# Patient Record
Sex: MALE | Race: WHITE | NOT HISPANIC OR LATINO | Employment: OTHER | URBAN - NONMETROPOLITAN AREA
[De-identification: names, ages, dates, MRNs, and addresses within clinical notes are randomized per-mention and may not be internally consistent; named-entity substitution may affect disease eponyms.]

---

## 2017-01-25 ENCOUNTER — OFFICE VISIT (OUTPATIENT)
Dept: CARDIOLOGY | Facility: CLINIC | Age: 67
End: 2017-01-25

## 2017-01-25 VITALS
DIASTOLIC BLOOD PRESSURE: 92 MMHG | BODY MASS INDEX: 36.94 KG/M2 | SYSTOLIC BLOOD PRESSURE: 178 MMHG | HEIGHT: 70 IN | WEIGHT: 258 LBS | HEART RATE: 62 BPM

## 2017-01-25 DIAGNOSIS — E78.5 HYPERLIPIDEMIA LDL GOAL <70: ICD-10-CM

## 2017-01-25 DIAGNOSIS — I25.10 CORONARY ARTERY DISEASE INVOLVING NATIVE CORONARY ARTERY OF NATIVE HEART WITHOUT ANGINA PECTORIS: Primary | ICD-10-CM

## 2017-01-25 DIAGNOSIS — I10 ESSENTIAL HYPERTENSION: ICD-10-CM

## 2017-01-25 PROCEDURE — 99213 OFFICE O/P EST LOW 20 MIN: CPT | Performed by: NURSE PRACTITIONER

## 2017-01-25 RX ORDER — LOSARTAN POTASSIUM 25 MG/1
25 TABLET ORAL DAILY
Qty: 90 TABLET | Refills: 3 | Status: SHIPPED | OUTPATIENT
Start: 2017-01-25 | End: 2017-04-19 | Stop reason: SINTOL

## 2017-01-25 RX ORDER — MULTIVIT WITH MINERALS/LUTEIN
1 TABLET ORAL DAILY
COMMUNITY

## 2017-01-25 RX ORDER — OMEPRAZOLE 40 MG/1
40 CAPSULE, DELAYED RELEASE ORAL DAILY
COMMUNITY
End: 2020-11-06 | Stop reason: SDUPTHER

## 2017-01-25 RX ORDER — CHLORTHALIDONE 25 MG/1
25 TABLET ORAL DAILY
Qty: 90 TABLET | Refills: 3 | Status: SHIPPED | OUTPATIENT
Start: 2017-01-25 | End: 2017-07-07 | Stop reason: SDUPTHER

## 2017-01-25 RX ORDER — CHLORTHALIDONE 25 MG/1
25 TABLET ORAL DAILY
COMMUNITY
End: 2017-01-25 | Stop reason: SDUPTHER

## 2017-01-25 NOTE — MR AVS SNAPSHOT
Ed Geller   1/25/2017 10:30 AM   Office Visit    Dept Phone:  [phone]   Encounter #:  [id number]    Provider:  Carlos Richter MD   Department:  [facility] CARDIOLOGY                Your Full Care Plan              Today's Medication Changes          These changes are accurate as of: 1/25/17 10:39 AM.  If you have any questions, ask your nurse or doctor.               New Medication(s)Ordered:     losartan 25 MG tablet   Commonly known as:  COZAAR   Take 1 tablet by mouth Daily.   Started by:  Carlos Richter MD            Where to Get Your Medications      These medications were sent to [address] - [address] - [phone]  - [phone]   [address], [place] [address]     Phone:  [phone]     losartan 25 MG tablet                  Your Updated Medication List          This list is accurate as of: 1/25/17 10:39 AM.  Always use your most recent med list.                aspirin 81 MG tablet       CENTRUM SILVER tablet       chlorthalidone 25 MG tablet   Commonly known as:  HYGROTON       GLUCOSAMINE-CHONDROITIN PO       losartan 25 MG tablet   Commonly known as:  COZAAR   Take 1 tablet by mouth Daily.       metoprolol tartrate 25 MG tablet   Commonly known as:  LOPRESSOR       omeprazole 40 MG capsule   Commonly known as:  priLOSEC               We Performed the Following     Lipid Panel       You Were Diagnosed With        Codes Comments    Coronary artery disease involving native coronary artery of native heart without angina pectoris    -  Primary ICD-10-CM: I25.10  ICD-9-CM: 414.01     Essential hypertension     ICD-10-CM: I10  ICD-9-CM: 401.9     Hyperlipidemia LDL goal <70     ICD-10-CM: E78.5  ICD-9-CM: 272.4       Instructions     None    Patient Instructions History      Upcoming Appointments     Visit Type Date Time Department    FOLLOW UP 1/25/2017 10:30 AM JUAN M RICHTER    FOLLOW  "UP 2017 10:15 AM JUAN M RICHTER      [employer] Signup     [facility] [employer] allows you to send messages to your doctor, view your test results, renew your prescriptions, schedule appointments, and more. To sign up, go to [employer] and click on the Sign Up Now link in the New User? box. Enter your [employer] Activation Code exactly as it appears below along with the last four digits of your Social Security Number and your Date of Birth () to complete the sign-up process. If you do not sign up before the expiration date, you must request a new code.    B[employer] Activation Code: [email or web address]  Expires: 2017 10:38 AM    If you have questions, you can email [email or web address] or call [phone] to talk to our B[employer] staff. Remember, B[employer] is NOT to be used for urgent needs. For medical emergencies, dial 911.               Other Info from Your Visit           Your Appointments     2017 10:15 AM EDT   Follow Up with Carlos Richter MD   [facility] [place] [facility] (--)    [address]  [facility] [address] [phone]           Arrive 15 minutes prior to appointment.              Allergies     Ciprofloxacin      Atorvastatin  Rash      Reason for Visit     Follow-up     Coronary Artery Disease     Hypertension     Hyperlipidemia           Vital Signs     Blood Pressure Pulse Height Weight Body Mass Index Smoking Status    178/92 (BP Location: Right arm, Patient Position: Sitting) 62 70\" (177.8 cm) 258 lb (117 kg) 37.02 kg/m2 Never Smoker      Problems and Diagnoses Noted     Coronary artery disease involving native coronary artery of native heart without angina pectoris    High blood pressure    Hyperlipidemia LDL goal <70        "

## 2017-01-25 NOTE — LETTER
January 25, 2017     Phoebe Verma, APRN  [address]  [address]  [facility] [address]    Patient: Ed Geller   YOB: 1950   Date of Visit: 1/25/2017       Dear Dr. Verma, ELY:    Thank you for referring Ed Geller to me for evaluation. Below are the relevant portions of my assessment and plan of care.    If you have questions, please do not hesitate to call me. I look forward to following Ed along with you.         Sincerely,        Carlos Richter MD        CC: No Recipients  ELY Delacruz  1/25/2017 10:44 AM  Signed  Encounter Date:01/25/2017    Patient ID: Ed Geller is a 66 y.o. male who is  and resides in [place], [place].    CC/Reason for visit:  Follow-up; Coronary Artery Disease; Hypertension; and Hyperlipidemia          Ed Geller returns today for follow-up of his coronary artery disease, hypertension and hyperlipidemia.  Since his last visit with us he has done well.  He denies any signs or symptoms to suggest angina pectoris.  He specifically denies chest pain/heaviness/pressure, dyspnea, orthopnea, palpitations or syncope.  At his last visit he was advised to start Crestor given his known history of coronary artery disease however he did not do this and is very hesitant about starting any statin therapy since the results of his lipid profile indicated a low cholesterol and LDL level..  His blood pressure was elevated at his last visit and his chlorthalidone was increased to 25 mg daily.  His blood pressure remains elevated at today's visit with a systolic in the 170s.  The patient does report he has started an exercise program and is trying to lose weight.  He has started a low carb diet and is walking on his treadmill for 30 minutes most days of the week.  He is able to do these activities without any difficulties.  He is hoping that his lifestyle modifications will assist him and control of his blood pressure.    Review of Systems   All other systems  "reviewed and are negative.      The patient's past medical, social, and family history reviewed in the patient's electronic medical record.    Allergies  Ciprofloxacin and Atorvastatin    Outpatient Prescriptions Marked as Taking for the 1/25/17 encounter (Office Visit) with Carlos Richter MD   Medication Sig Dispense Refill   • aspirin 81 MG tablet Take 81 mg by mouth Daily.     • chlorthalidone (HYGROTON) 25 MG tablet Take 1 tablet by mouth Daily. 90 tablet 3   • GLUCOSAMINE-CHONDROITIN PO Take 1 tablet by mouth Daily.     • metoprolol tartrate (LOPRESSOR) 25 MG tablet Take 1 tablet by mouth 2 (Two) Times a Day. 180 tablet 3   • Multiple Vitamins-Minerals (CENTRUM SILVER) tablet Take 1 tablet by mouth Daily.     • omeprazole (priLOSEC) 40 MG capsule Take 40 mg by mouth Daily.     • [DISCONTINUED] chlorthalidone (HYGROTON) 25 MG tablet Take 25 mg by mouth Daily.     • [DISCONTINUED] metoprolol tartrate (LOPRESSOR) 25 MG tablet Take 25 mg by mouth 2 (Two) Times a Day.           Blood pressure 178/92, pulse 62, height 70\" (177.8 cm), weight 258 lb (117 kg).  Body mass index is 37.02 kg/(m^2).    Physical Exam   Constitutional: He is oriented to person, place, and time. He appears well-developed and well-nourished.   HENT:   Head: Normocephalic and atraumatic.   Eyes: Pupils are equal, round, and reactive to light. No scleral icterus.   Neck: No JVD present. Carotid bruit is not present. No thyromegaly present.   Cardiovascular: Normal rate, regular rhythm, S1 normal and S2 normal.  Exam reveals no gallop.    No murmur heard.  Pulmonary/Chest: Effort normal and breath sounds normal.   Abdominal: Soft. There is no tenderness.   Neurological: He is alert and oriented to person, place, and time.   Skin: Skin is warm and dry. No cyanosis. Nails show no clubbing.   Psychiatric: He has a normal mood and affect. His behavior is normal.       Data Review:   Procedures         Problem List Items Addressed This Visit "        Cardiovascular and Mediastinum    Coronary artery disease involving native coronary artery of native heart without angina pectoris - Primary    Overview     · Cardiac catheterization (2010) Dr. Whitehead:  60% mid-LAD stenosis, normal LVEF.  · Pharmacologic MPS (10/23/2015):  Mild apical ischemia, normal LVEF.  Low risk study.   · CCS class II anginal symptoms.           Current Assessment & Plan     · Continue aspirin 81 mg daily  · Continue metoprolol tartrate 25 mg twice a day         Relevant Medications    metoprolol tartrate (LOPRESSOR) 25 MG tablet    Essential hypertension    Current Assessment & Plan     · Start losartan 25 mg daily   · CMP in 2 weeks  · Monitor blood pressure and contact us if SBP remains >140 after 3 weeks  · Continue chlorthalidone 25 mg daily           Relevant Medications    losartan (COZAAR) 25 MG tablet    chlorthalidone (HYGROTON) 25 MG tablet    metoprolol tartrate (LOPRESSOR) 25 MG tablet    Hyperlipidemia LDL goal <70    Overview     · High intensity statin therapy indicated.         Current Assessment & Plan     · Obtain lipid panel  · Patient refuses statin therapy at this time we'll readdress at next visit         Relevant Orders    Comprehensive Metabolic Panel    Lipid Panel        Mr. Segal blood pressure remains uncontrolled at this time.  We will start losartan 25 mg daily and check his renal function in 2 weeks.  I have requested he monitor his blood pressure over the next 3-4 weeks and to contact us if his systolic blood pressure is consistently above 140 as we will want to increase his dose of losartan to 50 mg daily.  He does not wish to start statin therapy at this time and given the fact his cholesterol numbers are low this is appropriate.  I we will recheck his lipid panel and will address statin therapy at his follow-up visit.       · Follow-up 6 months or sooner if needed.    Kat GRAVES evaluated and treated patient independently    Cherelle  Anuel, ELY  1/25/2017

## 2017-01-25 NOTE — PROGRESS NOTES
Encounter Date:01/25/2017    Patient ID: Ed Geller is a 66 y.o. male who is  and resides in [place], [place].    CC/Reason for visit:  Follow-up; Coronary Artery Disease; Hypertension; and Hyperlipidemia          Ed Geller returns today for follow-up of his coronary artery disease, hypertension and hyperlipidemia.  Since his last visit with us he has done well.  He denies any signs or symptoms to suggest angina pectoris.  He specifically denies chest pain/heaviness/pressure, dyspnea, orthopnea, palpitations or syncope.  At his last visit he was advised to start Crestor given his known history of coronary artery disease however he did not do this and is very hesitant about starting any statin therapy since the results of his lipid profile indicated a low cholesterol and LDL level..  His blood pressure was elevated at his last visit and his chlorthalidone was increased to 25 mg daily.  His blood pressure remains elevated at today's visit with a systolic in the 170s.  The patient does report he has started an exercise program and is trying to lose weight.  He has started a low carb diet and is walking on his treadmill for 30 minutes most days of the week.  He is able to do these activities without any difficulties.  He is hoping that his lifestyle modifications will assist him and control of his blood pressure.    Review of Systems   All other systems reviewed and are negative.      The patient's past medical, social, and family history reviewed in the patient's electronic medical record.    Allergies  Ciprofloxacin and Atorvastatin    Outpatient Prescriptions Marked as Taking for the 1/25/17 encounter (Office Visit) with Carlos Richter MD   Medication Sig Dispense Refill   • aspirin 81 MG tablet Take 81 mg by mouth Daily.     • chlorthalidone (HYGROTON) 25 MG tablet Take 1 tablet by mouth Daily. 90 tablet 3   • GLUCOSAMINE-CHONDROITIN PO Take 1 tablet by mouth Daily.     • metoprolol tartrate  "(LOPRESSOR) 25 MG tablet Take 1 tablet by mouth 2 (Two) Times a Day. 180 tablet 3   • Multiple Vitamins-Minerals (CENTRUM SILVER) tablet Take 1 tablet by mouth Daily.     • omeprazole (priLOSEC) 40 MG capsule Take 40 mg by mouth Daily.     • [DISCONTINUED] chlorthalidone (HYGROTON) 25 MG tablet Take 25 mg by mouth Daily.     • [DISCONTINUED] metoprolol tartrate (LOPRESSOR) 25 MG tablet Take 25 mg by mouth 2 (Two) Times a Day.           Blood pressure 178/92, pulse 62, height 70\" (177.8 cm), weight 258 lb (117 kg).  Body mass index is 37.02 kg/(m^2).    Physical Exam   Constitutional: He is oriented to person, place, and time. He appears well-developed and well-nourished.   HENT:   Head: Normocephalic and atraumatic.   Eyes: Pupils are equal, round, and reactive to light. No scleral icterus.   Neck: No JVD present. Carotid bruit is not present. No thyromegaly present.   Cardiovascular: Normal rate, regular rhythm, S1 normal and S2 normal.  Exam reveals no gallop.    No murmur heard.  Pulmonary/Chest: Effort normal and breath sounds normal.   Abdominal: Soft. There is no tenderness.   Neurological: He is alert and oriented to person, place, and time.   Skin: Skin is warm and dry. No cyanosis. Nails show no clubbing.   Psychiatric: He has a normal mood and affect. His behavior is normal.       Data Review:   Procedures         Problem List Items Addressed This Visit        Cardiovascular and Mediastinum    Coronary artery disease involving native coronary artery of native heart without angina pectoris - Primary    Overview     · Cardiac catheterization (2010) Dr. Whitehead:  60% mid-LAD stenosis, normal LVEF.  · Pharmacologic MPS (10/23/2015):  Mild apical ischemia, normal LVEF.  Low risk study.   · CCS class II anginal symptoms.           Current Assessment & Plan     · Continue aspirin 81 mg daily  · Continue metoprolol tartrate 25 mg twice a day         Relevant Medications    metoprolol tartrate (LOPRESSOR) 25 MG " tablet    Essential hypertension    Current Assessment & Plan     · Start losartan 25 mg daily   · CMP in 2 weeks  · Monitor blood pressure and contact us if SBP remains >140 after 3 weeks  · Continue chlorthalidone 25 mg daily           Relevant Medications    losartan (COZAAR) 25 MG tablet    chlorthalidone (HYGROTON) 25 MG tablet    metoprolol tartrate (LOPRESSOR) 25 MG tablet    Hyperlipidemia LDL goal <70    Overview     · High intensity statin therapy indicated.         Current Assessment & Plan     · Obtain lipid panel  · Patient refuses statin therapy at this time we'll readdress at next visit         Relevant Orders    Comprehensive Metabolic Panel    Lipid Panel        Mr. Segal blood pressure remains uncontrolled at this time.  We will start losartan 25 mg daily and check his renal function in 2 weeks.  I have requested he monitor his blood pressure over the next 3-4 weeks and to contact us if his systolic blood pressure is consistently above 140 as we will want to increase his dose of losartan to 50 mg daily.  He does not wish to start statin therapy at this time and given the fact his cholesterol numbers are low this is appropriate.  I we will recheck his lipid panel and will address statin therapy at his follow-up visit.       · Follow-up 6 months or sooner if needed.    Kat GRAVES evaluated and treated patient independently    ELY Delacruz  1/25/2017

## 2017-01-25 NOTE — ASSESSMENT & PLAN NOTE
· Start losartan 25 mg daily   · CMP in 2 weeks  · Monitor blood pressure and contact us if SBP remains >140 after 3 weeks  · Continue chlorthalidone 25 mg daily

## 2017-04-19 ENCOUNTER — TELEPHONE (OUTPATIENT)
Dept: CARDIOLOGY | Facility: CLINIC | Age: 67
End: 2017-04-19

## 2017-04-19 RX ORDER — AMLODIPINE BESYLATE 5 MG/1
5 TABLET ORAL DAILY
Qty: 90 TABLET | Refills: 0 | Status: SHIPPED | OUTPATIENT
Start: 2017-04-19 | End: 2017-07-21 | Stop reason: SDUPTHER

## 2017-04-19 NOTE — TELEPHONE ENCOUNTER
Patient called reporting since starting the losartan, he has felt lightheaded and fatigued. His BP has been ranging 128-140/50-60's, HR 58. He is on losartan 25 mg, metoprolol 25 mg BID, chlorthalidone 25 mg daily. He has tried lisinopril in the past but could not tolerate d/t rash. Is there something else he could try besides the losartan?

## 2017-07-07 RX ORDER — CHLORTHALIDONE 25 MG/1
25 TABLET ORAL DAILY
Qty: 90 TABLET | Refills: 0 | Status: SHIPPED | OUTPATIENT
Start: 2017-07-07 | End: 2017-07-26 | Stop reason: SDUPTHER

## 2017-07-07 NOTE — TELEPHONE ENCOUNTER
I spoke with the patient and he verbalized understanding of obtaining lab work. Lab orders mailed.

## 2017-07-21 RX ORDER — AMLODIPINE BESYLATE 5 MG/1
5 TABLET ORAL DAILY
Qty: 90 TABLET | Refills: 1 | Status: SHIPPED | OUTPATIENT
Start: 2017-07-21 | End: 2017-07-26 | Stop reason: SDUPTHER

## 2017-07-26 ENCOUNTER — OFFICE VISIT (OUTPATIENT)
Dept: CARDIOLOGY | Facility: CLINIC | Age: 67
End: 2017-07-26

## 2017-07-26 VITALS
SYSTOLIC BLOOD PRESSURE: 144 MMHG | BODY MASS INDEX: 38.11 KG/M2 | HEIGHT: 70 IN | DIASTOLIC BLOOD PRESSURE: 80 MMHG | WEIGHT: 266.2 LBS | HEART RATE: 66 BPM

## 2017-07-26 DIAGNOSIS — E78.5 HYPERLIPIDEMIA LDL GOAL <70: ICD-10-CM

## 2017-07-26 DIAGNOSIS — I25.10 CORONARY ARTERY DISEASE INVOLVING NATIVE CORONARY ARTERY OF NATIVE HEART WITHOUT ANGINA PECTORIS: Primary | ICD-10-CM

## 2017-07-26 DIAGNOSIS — I10 ESSENTIAL HYPERTENSION: ICD-10-CM

## 2017-07-26 PROCEDURE — 99214 OFFICE O/P EST MOD 30 MIN: CPT | Performed by: INTERNAL MEDICINE

## 2017-07-26 RX ORDER — PSEUDOEPHEDRINE HCL 30 MG
30 TABLET ORAL DAILY
COMMUNITY
End: 2018-09-26

## 2017-07-26 RX ORDER — CHLORTHALIDONE 25 MG/1
25 TABLET ORAL DAILY
Qty: 90 TABLET | Refills: 3 | Status: SHIPPED | OUTPATIENT
Start: 2017-07-26 | End: 2018-02-14 | Stop reason: SDUPTHER

## 2017-07-26 RX ORDER — AMLODIPINE BESYLATE 5 MG/1
5 TABLET ORAL DAILY
Qty: 90 TABLET | Refills: 3 | Status: SHIPPED | OUTPATIENT
Start: 2017-07-26 | End: 2018-02-14 | Stop reason: SDUPTHER

## 2017-07-26 RX ORDER — CETIRIZINE HYDROCHLORIDE 10 MG/1
10 TABLET ORAL AS NEEDED
Status: ON HOLD | COMMUNITY
End: 2022-11-02

## 2017-07-26 NOTE — ASSESSMENT & PLAN NOTE
· The patient continues to have no anginal symptoms  · Continue aspirin, beta blocker, calcium channel blocker therapy.  · Patient refuses statin therapy

## 2017-07-26 NOTE — PROGRESS NOTES
Encounter Date:07/26/2017    Patient ID: Ed Geller is a  67 y.o. male who resides in [place], [place].    CC/Reason for visit:  Coronary Artery Disease; Hypertension; and Hyperlipidemia          Ed Geller returns to my office today as a follow up for coronary artery disease, hypertension, and hyperlipidemia. Since last visit, patient has been feeling well overall from a cardiovascular standpoint. He denies chest pain. He states his blood pressure has been well controlled since his medication was adjusted.     Review of Systems   Constitution: Negative for weakness and malaise/fatigue.   Eyes: Negative for vision loss in left eye and vision loss in right eye.   Cardiovascular: Negative for chest pain, dyspnea on exertion, near-syncope, orthopnea, palpitations, paroxysmal nocturnal dyspnea and syncope.   Musculoskeletal: Negative for myalgias.   Neurological: Negative for brief paralysis, excessive daytime sleepiness, focal weakness, numbness and paresthesias.   All other systems reviewed and are negative.      The patient's past medical, social, and family history reviewed in the patient's electronic medical record.    Allergies  Ciprofloxacin; Losartan; Sulfur; Atorvastatin; and Lisinopril    Outpatient Prescriptions Marked as Taking for the 7/26/17 encounter (Office Visit) with Carlos Richter MD   Medication Sig Dispense Refill   • amLODIPine (NORVASC) 5 MG tablet Take 1 tablet by mouth Daily for 360 days. 90 tablet 3   • aspirin 81 MG tablet Take 1 tablet by mouth Daily for 360 days. 90 tablet 3   • cetirizine (zyrTEC) 10 MG tablet Take 10 mg by mouth Daily.     • chlorthalidone (HYGROTON) 25 MG tablet Take 1 tablet by mouth Daily for 360 days. 90 tablet 3   • Cyanocobalamin (VITAMIN B12 PO) Take 5,000 mcg by mouth Daily.     • GLUCOSAMINE-CHONDROITIN PO Take 1 tablet by mouth Daily.     • Multiple Vitamins-Minerals (CENTRUM SILVER) tablet Take 1 tablet by mouth Daily.     • omeprazole (priLOSEC)  "40 MG capsule Take 40 mg by mouth Daily.     • pseudoephedrine (SUDAFED) 30 MG tablet Take 30 mg by mouth Daily.     • [DISCONTINUED] amLODIPine (NORVASC) 5 MG tablet Take 1 tablet by mouth Daily. 90 tablet 1   • [DISCONTINUED] aspirin 81 MG tablet Take 81 mg by mouth Daily.     • [DISCONTINUED] chlorthalidone (HYGROTON) 25 MG tablet Take 1 tablet by mouth Daily. 90 tablet 0         Blood pressure 144/80, pulse 66, height 70\" (177.8 cm), weight 266 lb 3.2 oz (121 kg).  Body mass index is 38.2 kg/(m^2).    Physical Exam   Constitutional: He is oriented to person, place, and time. He appears well-developed and well-nourished.   HENT:   Head: Normocephalic and atraumatic.   Eyes: Pupils are equal, round, and reactive to light. No scleral icterus.   Neck: No JVD present. Carotid bruit is not present. No thyromegaly present.   Cardiovascular: Normal rate, regular rhythm, S1 normal and S2 normal.  Exam reveals no gallop.    No murmur heard.  Pulmonary/Chest: Effort normal and breath sounds normal.   Abdominal: Soft. There is no tenderness.   Neurological: He is alert and oriented to person, place, and time.   Skin: Skin is warm and dry. No cyanosis. Nails show no clubbing.   Psychiatric: He has a normal mood and affect. His behavior is normal.       Data Review:   Procedures         Problem List Items Addressed This Visit        Cardiovascular and Mediastinum    Coronary artery disease involving native coronary artery of native heart without angina pectoris - Primary    Overview     · Cardiac catheterization at [facility] (2010):  60% mid-LAD stenosis, normal LVEF.  · Pharmacologic MPS (10/23/2015):  Mild apical ischemia, normal LVEF.  Low risk study.          Current Assessment & Plan     · The patient continues to have no anginal symptoms  · Continue aspirin, beta blocker, calcium channel blocker therapy.  · Patient refuses statin therapy         Relevant Medications    amLODIPine (NORVASC) 5 MG tablet    aspirin 81 MG tablet "    metoprolol tartrate (LOPRESSOR) 25 MG tablet    Essential hypertension    Current Assessment & Plan     · Blood pressure mildly elevated today's visit.  · Patient did not tolerate losartan but is doing well with amlodipine  · Do not recommend any changes at present.         Relevant Medications    amLODIPine (NORVASC) 5 MG tablet    metoprolol tartrate (LOPRESSOR) 25 MG tablet    chlorthalidone (HYGROTON) 25 MG tablet    Hyperlipidemia LDL goal <70    Overview     · High intensity statin therapy indicated.         Current Assessment & Plan     · Patient refuses statin therapy                    · Continue present medical therapy  · If blood pressure elevated at future office visits, increase amlodipine to 10 mg daily    Carlos Richter MD  7/26/2017     Scribed for Carlos Richter MD by Ernesto Wadsworth. 7/26/2017  10:08 AM    I, Carlos Richter MD, personally performed the services described in this documentation as scribed by the above named individual in my presence, and it is both accurate and complete.  7/26/2017  10:08 AM

## 2017-07-26 NOTE — ASSESSMENT & PLAN NOTE
· Blood pressure mildly elevated today's visit.  · Patient did not tolerate losartan but is doing well with amlodipine  · Do not recommend any changes at present.

## 2017-07-28 DIAGNOSIS — E78.5 HYPERLIPIDEMIA: ICD-10-CM

## 2017-07-28 DIAGNOSIS — E78.5 HYPERLIPIDEMIA LDL GOAL <70: ICD-10-CM

## 2018-02-14 ENCOUNTER — OFFICE VISIT (OUTPATIENT)
Dept: CARDIOLOGY | Facility: CLINIC | Age: 68
End: 2018-02-14

## 2018-02-14 VITALS
WEIGHT: 260 LBS | BODY MASS INDEX: 38.51 KG/M2 | HEART RATE: 71 BPM | HEIGHT: 69 IN | SYSTOLIC BLOOD PRESSURE: 130 MMHG | DIASTOLIC BLOOD PRESSURE: 82 MMHG

## 2018-02-14 DIAGNOSIS — I10 ESSENTIAL HYPERTENSION: ICD-10-CM

## 2018-02-14 DIAGNOSIS — I25.119 CORONARY ARTERY DISEASE INVOLVING NATIVE CORONARY ARTERY OF NATIVE HEART WITH ANGINA PECTORIS (HCC): Primary | ICD-10-CM

## 2018-02-14 DIAGNOSIS — E78.5 HYPERLIPIDEMIA LDL GOAL <70: ICD-10-CM

## 2018-02-14 PROCEDURE — 99214 OFFICE O/P EST MOD 30 MIN: CPT | Performed by: INTERNAL MEDICINE

## 2018-02-14 RX ORDER — ROSUVASTATIN CALCIUM 10 MG/1
10 TABLET, COATED ORAL DAILY
Qty: 90 TABLET | Refills: 3 | Status: SHIPPED | OUTPATIENT
Start: 2018-02-14 | End: 2018-09-26 | Stop reason: SDUPTHER

## 2018-02-14 RX ORDER — CHLORTHALIDONE 25 MG/1
25 TABLET ORAL DAILY
Qty: 90 TABLET | Refills: 3 | Status: SHIPPED | OUTPATIENT
Start: 2018-02-14 | End: 2018-09-26 | Stop reason: SDUPTHER

## 2018-02-14 RX ORDER — NITROGLYCERIN 0.4 MG/1
TABLET SUBLINGUAL
Qty: 25 TABLET | Refills: 5 | Status: SHIPPED | OUTPATIENT
Start: 2018-02-14 | End: 2018-09-26 | Stop reason: SDUPTHER

## 2018-02-14 RX ORDER — AMLODIPINE BESYLATE 5 MG/1
5 TABLET ORAL DAILY
Qty: 90 TABLET | Refills: 3 | Status: SHIPPED | OUTPATIENT
Start: 2018-02-14 | End: 2018-09-26 | Stop reason: SDUPTHER

## 2018-02-14 NOTE — PROGRESS NOTES
Encounter Date:02/14/2018    Patient ID: Ed Geller is a  67 y.o. male who resides in [place], [place].    CC/Reason for visit:  Coronary Artery Disease            Ed Geller returns to my office today in follow up of his coronary artery disease, hypertension, and hyperlipidemia. Since last visit, patient has been feeling well overall from a cardiovascular standpoint. He states that he has been feeling the best he has in 25 years from a cardiac standpoint. He reports that he missed his last two appointments due to lack of cell phone service. He denies chest pain or discomfort. He monitors his blood pressure at home and notes it typically runs within normal limits. Patient states that he is not as physically active as he once was, but attributes it to lower extremity pain from bilateral achilles tendon pain.     Review of Systems   Constitution: Negative for weakness and malaise/fatigue.   Eyes: Negative for vision loss in left eye and vision loss in right eye.   Cardiovascular: Negative for chest pain, dyspnea on exertion, near-syncope, orthopnea, palpitations, paroxysmal nocturnal dyspnea and syncope.   Musculoskeletal: Negative for myalgias.   Neurological: Negative for brief paralysis, excessive daytime sleepiness, focal weakness, numbness and paresthesias.   All other systems reviewed and are negative.      The patient's past medical, social, family history and ROS reviewed in the patient's electronic medical record.    Allergies  Ciprofloxacin; Losartan; Sulfur; Atorvastatin; and Lisinopril    Outpatient Prescriptions Marked as Taking for the 2/14/18 encounter (Office Visit) with Carlos Richter IV, MD   Medication Sig Dispense Refill   • amLODIPine (NORVASC) 5 MG tablet Take 1 tablet by mouth Daily for 360 days. 90 tablet 3   • aspirin 81 MG tablet Take 1 tablet by mouth Daily. 90 tablet 3   • cetirizine (zyrTEC) 10 MG tablet Take 10 mg by mouth Daily.     • chlorthalidone (HYGROTON) 25 MG tablet  "Take 1 tablet by mouth Daily for 360 days. 90 tablet 3   • Cyanocobalamin (VITAMIN B12 PO) Take 5,000 mcg by mouth Daily.     • GLUCOSAMINE-CHONDROITIN PO Take 1 tablet by mouth Daily.     • metoprolol tartrate (LOPRESSOR) 25 MG tablet Take 1 tablet by mouth Every 12 (Twelve) Hours. 180 tablet 3   • Multiple Vitamins-Minerals (CENTRUM SILVER) tablet Take 1 tablet by mouth Daily.     • omeprazole (priLOSEC) 40 MG capsule Take 40 mg by mouth Daily.     • pseudoephedrine (SUDAFED) 30 MG tablet Take 30 mg by mouth Daily.     • [DISCONTINUED] amLODIPine (NORVASC) 5 MG tablet Take 1 tablet by mouth Daily for 360 days. 90 tablet 3   • [DISCONTINUED] aspirin 81 MG tablet Take 1 tablet by mouth Daily for 360 days. 90 tablet 3   • [DISCONTINUED] chlorthalidone (HYGROTON) 25 MG tablet Take 1 tablet by mouth Daily for 360 days. 90 tablet 3   • [DISCONTINUED] metoprolol tartrate (LOPRESSOR) 25 MG tablet Take 1 tablet by mouth 2 (Two) Times a Day. 180 tablet 3         Blood pressure 130/82, pulse 71, height 175.3 cm (69\"), weight 118 kg (260 lb).  Body mass index is 38.4 kg/(m^2).    Physical Exam   Constitutional: He is oriented to person, place, and time. He appears well-developed and well-nourished.   HENT:   Head: Normocephalic and atraumatic.   Eyes: Pupils are equal, round, and reactive to light. No scleral icterus.   Neck: No JVD present. Carotid bruit is not present. No thyromegaly present.   Cardiovascular: Normal rate, regular rhythm, S1 normal and S2 normal.  Exam reveals no gallop.    No murmur heard.  Pulmonary/Chest: Effort normal and breath sounds normal.   Abdominal: Soft. There is no hepatosplenomegaly. There is no tenderness.   Neurological: He is alert and oriented to person, place, and time.   Skin: Skin is warm and dry. No cyanosis. Nails show no clubbing.   Psychiatric: He has a normal mood and affect. His behavior is normal.       Data Review:     Procedures       Problem List Items Addressed This Visit     "    Cardiovascular and Mediastinum    Coronary artery disease involving native coronary artery of native heart with angina pectoris - Primary    Overview     · Cardiac catheterization at [facility] (2010):  60% mid-LAD stenosis, normal LVEF.  · Pharmacologic nuclear stress (10/23/2015):  Mild apical ischemia, normal LVEF.  Low risk study.          Current Assessment & Plan     · The patient continues to have no anginal symptoms  · Continue aspirin, beta blocker, calcium channel blocker therapy         Relevant Medications    aspirin 81 MG tablet    metoprolol tartrate (LOPRESSOR) 25 MG tablet    amLODIPine (NORVASC) 5 MG tablet    nitroglycerin (NITROSTAT) 0.4 MG SL tablet    Essential hypertension    Relevant Medications    metoprolol tartrate (LOPRESSOR) 25 MG tablet    chlorthalidone (HYGROTON) 25 MG tablet    amLODIPine (NORVASC) 5 MG tablet    Hyperlipidemia LDL goal <70    Overview     · High intensity statin therapy indicated.         Current Assessment & Plan     · Obtain CMP and lipids today  · Initiate statin therapy based on results         Relevant Medications    rosuvastatin (CRESTOR) 10 MG tablet    Other Relevant Orders    Comprehensive Metabolic Panel    Lipid Panel               · Continue present medical therapy  · Obtain CMP, lipids, and CBC today  · Initiate statin therapy based on his lipid results    Carlos Richter IV, MD  2/14/2018     Scribed for Carlos Richter IV, MD by Ernesto Wadsworth. 2/14/2018  6:03 PM

## 2018-02-14 NOTE — ASSESSMENT & PLAN NOTE
· The patient continues to have no anginal symptoms  · Continue aspirin, beta blocker, calcium channel blocker therapy

## 2018-09-12 DIAGNOSIS — I10 ESSENTIAL HYPERTENSION: ICD-10-CM

## 2018-09-12 DIAGNOSIS — I25.119 CORONARY ARTERY DISEASE INVOLVING NATIVE CORONARY ARTERY OF NATIVE HEART WITH ANGINA PECTORIS (HCC): ICD-10-CM

## 2018-09-25 NOTE — PROGRESS NOTES
Encounter Date:09/26/2018    Patient ID: Ed Geller is a  68 y.o. male who resides in [place], [place].    CC/Reason for visit:  Coronary Artery Disease            Ed Geller returns to the office today for a 6 month follow-up for coronary artery disease and cardiac risk factors. The patient denies chest pain and has been feeling better since he started taking metoprolol. He states his blood pressures been well-controlled. He is been working out doing aerobic and weightlifting activities with his 13-year-old adopted son.    Review of Systems   Constitution: Negative for weakness and malaise/fatigue.   Eyes: Negative for vision loss in left eye and vision loss in right eye.   Cardiovascular: Negative for chest pain, dyspnea on exertion, near-syncope, orthopnea, palpitations, paroxysmal nocturnal dyspnea and syncope.   Musculoskeletal: Negative for myalgias.   Neurological: Negative for brief paralysis, excessive daytime sleepiness, focal weakness, numbness and paresthesias.   All other systems reviewed and are negative.      The patient's past medical, social, family history and ROS reviewed in the patient's electronic medical record.    Allergies  Ciprofloxacin; Losartan; Sulfur; Atorvastatin; and Lisinopril    Outpatient Prescriptions Marked as Taking for the 9/26/18 encounter (Office Visit) with Carlos Richter IV, MD   Medication Sig Dispense Refill   • amLODIPine (NORVASC) 5 MG tablet Take 1 tablet by mouth Daily for 360 days. 90 tablet 3   • aspirin 81 MG tablet Take 1 tablet by mouth Daily. 90 tablet 3   • cetirizine (zyrTEC) 10 MG tablet Take 10 mg by mouth Daily.     • chlorthalidone (HYGROTON) 25 MG tablet Take 1 tablet by mouth Daily for 360 days. 90 tablet 3   • Cyanocobalamin (VITAMIN B12 PO) Take 5,000 mcg by mouth Daily.     • GLUCOSAMINE-CHONDROITIN PO Take 1 tablet by mouth Daily.     • metoprolol tartrate (LOPRESSOR) 25 MG tablet Take 1 tablet by mouth Every 12 (Twelve) Hours.  "180 tablet 3   • Multiple Vitamins-Minerals (CENTRUM SILVER) tablet Take 1 tablet by mouth Daily.     • nitroglycerin (NITROSTAT) 0.4 MG SL tablet 1 under the tongue as needed for angina, may repeat q5mins for up three doses 25 tablet 5   • omeprazole (priLOSEC) 40 MG capsule Take 40 mg by mouth Daily.     • rosuvastatin (CRESTOR) 10 MG tablet Take 1 tablet by mouth Daily. 90 tablet 3   • [DISCONTINUED] amLODIPine (NORVASC) 5 MG tablet Take 1 tablet by mouth Daily for 360 days. 90 tablet 3   • [DISCONTINUED] aspirin 81 MG tablet Take 1 tablet by mouth Daily. 90 tablet 3   • [DISCONTINUED] chlorthalidone (HYGROTON) 25 MG tablet Take 1 tablet by mouth Daily for 360 days. 90 tablet 3   • [DISCONTINUED] metoprolol tartrate (LOPRESSOR) 25 MG tablet Take 1 tablet by mouth Every 12 (Twelve) Hours. 180 tablet 3   • [DISCONTINUED] nitroglycerin (NITROSTAT) 0.4 MG SL tablet 1 under the tongue as needed for angina, may repeat q5mins for up three doses 25 tablet 5   • [DISCONTINUED] rosuvastatin (CRESTOR) 10 MG tablet Take 1 tablet by mouth Daily. 90 tablet 3           Blood pressure 128/84, pulse 76, height 175.3 cm (69\"), weight 118 kg (260 lb).  Body mass index is 38.4 kg/m².  There were no vitals filed for this visit.    Physical Exam   Constitutional: He is oriented to person, place, and time. He appears well-developed and well-nourished.   HENT:   Head: Normocephalic and atraumatic.   Eyes: Pupils are equal, round, and reactive to light. No scleral icterus.   Neck: No JVD present. Carotid bruit is not present. No thyromegaly present.   Cardiovascular: Normal rate and regular rhythm.  Exam reveals no gallop.    No murmur heard.  Pulmonary/Chest: Effort normal and breath sounds normal.   Abdominal: Soft. He exhibits no distension. There is no hepatosplenomegaly.   Musculoskeletal: He exhibits no edema.   Neurological: He is alert and oriented to person, place, and time.   Skin: Skin is warm and dry.   Psychiatric: He has a " normal mood and affect. His behavior is normal.       Data Review:     Procedures    CMP (4/2/18)  · Glucose 85  · BUN 18  · Creatinine 0.84  · Na 141  · K 3.4  · AST 15  · ALT 14    Lipid (4/2/18)  · TC 65  · TG 94  · HDL 33  · LDL 14         Problem List Items Addressed This Visit        Cardiovascular and Mediastinum    Coronary artery disease involving native coronary artery of native heart with angina pectoris (CMS/HCC) - Primary    Overview     · Cardiac catheterization at [facility] (2010):  60% mid-LAD stenosis, normal LVEF.  · Pharmacologic nuclear stress (10/23/2015):  Mild apical ischemia, normal LVEF.  Low risk study.          Current Assessment & Plan     · No anginal symptoms  · Continue aspirin, beta blocker, and high intensity statin therapy         Relevant Medications    metoprolol tartrate (LOPRESSOR) 25 MG tablet    nitroglycerin (NITROSTAT) 0.4 MG SL tablet    aspirin 81 MG tablet    amLODIPine (NORVASC) 5 MG tablet    Essential hypertension    Current Assessment & Plan     · Controlled  · Continue present medical therapy         Relevant Medications    metoprolol tartrate (LOPRESSOR) 25 MG tablet    amLODIPine (NORVASC) 5 MG tablet    chlorthalidone (HYGROTON) 25 MG tablet    Hyperlipidemia LDL goal <70    Overview     · High intensity statin therapy indicated.         Current Assessment & Plan     · Well-controlled  · Continue present medical therapy         Relevant Medications    rosuvastatin (CRESTOR) 10 MG tablet                · Continue present medical therapy  Return in about 12 months (around 9/26/2019) for Followup with [facility] only.   Patient instructed to contact the office if he develops exertional chest discomfort or exercise intolerance.      Carlos Richter IV, MD  9/26/2018    Scribed for Carlos Richter IV, MD by Lis Foote. 9/26/2018  4:02 PM

## 2018-09-26 ENCOUNTER — OFFICE VISIT (OUTPATIENT)
Dept: CARDIOLOGY | Facility: CLINIC | Age: 68
End: 2018-09-26

## 2018-09-26 VITALS
BODY MASS INDEX: 38.51 KG/M2 | DIASTOLIC BLOOD PRESSURE: 84 MMHG | HEART RATE: 76 BPM | HEIGHT: 69 IN | WEIGHT: 260 LBS | SYSTOLIC BLOOD PRESSURE: 128 MMHG

## 2018-09-26 DIAGNOSIS — I10 ESSENTIAL HYPERTENSION: ICD-10-CM

## 2018-09-26 DIAGNOSIS — I25.119 CORONARY ARTERY DISEASE INVOLVING NATIVE CORONARY ARTERY OF NATIVE HEART WITH ANGINA PECTORIS (HCC): Primary | ICD-10-CM

## 2018-09-26 DIAGNOSIS — E78.5 HYPERLIPIDEMIA LDL GOAL <70: ICD-10-CM

## 2018-09-26 PROCEDURE — 99214 OFFICE O/P EST MOD 30 MIN: CPT | Performed by: INTERNAL MEDICINE

## 2018-09-26 RX ORDER — ROSUVASTATIN CALCIUM 10 MG/1
10 TABLET, COATED ORAL DAILY
Qty: 90 TABLET | Refills: 3 | Status: SHIPPED | OUTPATIENT
Start: 2018-09-26 | End: 2019-09-27 | Stop reason: SDUPTHER

## 2018-09-26 RX ORDER — AMLODIPINE BESYLATE 5 MG/1
5 TABLET ORAL DAILY
Qty: 90 TABLET | Refills: 3 | Status: SHIPPED | OUTPATIENT
Start: 2018-09-26 | End: 2019-09-21

## 2018-09-26 RX ORDER — NITROGLYCERIN 0.4 MG/1
TABLET SUBLINGUAL
Qty: 25 TABLET | Refills: 5 | Status: SHIPPED | OUTPATIENT
Start: 2018-09-26

## 2018-09-26 RX ORDER — CHLORTHALIDONE 25 MG/1
25 TABLET ORAL DAILY
Qty: 90 TABLET | Refills: 3 | Status: SHIPPED | OUTPATIENT
Start: 2018-09-26 | End: 2019-09-21

## 2019-01-03 ENCOUNTER — TELEPHONE (OUTPATIENT)
Dept: CARDIOLOGY | Facility: CLINIC | Age: 69
End: 2019-01-03

## 2019-09-27 ENCOUNTER — OFFICE VISIT (OUTPATIENT)
Dept: CARDIOLOGY | Facility: CLINIC | Age: 69
End: 2019-09-27

## 2019-09-27 VITALS
DIASTOLIC BLOOD PRESSURE: 86 MMHG | SYSTOLIC BLOOD PRESSURE: 136 MMHG | WEIGHT: 270 LBS | HEART RATE: 72 BPM | OXYGEN SATURATION: 93 % | BODY MASS INDEX: 39.99 KG/M2 | HEIGHT: 69 IN

## 2019-09-27 DIAGNOSIS — E78.5 HYPERLIPIDEMIA LDL GOAL <70: ICD-10-CM

## 2019-09-27 DIAGNOSIS — I25.119 CORONARY ARTERY DISEASE INVOLVING NATIVE CORONARY ARTERY OF NATIVE HEART WITH ANGINA PECTORIS (HCC): Primary | ICD-10-CM

## 2019-09-27 DIAGNOSIS — I10 ESSENTIAL HYPERTENSION: ICD-10-CM

## 2019-09-27 PROCEDURE — 99214 OFFICE O/P EST MOD 30 MIN: CPT | Performed by: INTERNAL MEDICINE

## 2019-09-27 RX ORDER — ROSUVASTATIN CALCIUM 10 MG/1
10 TABLET, COATED ORAL DAILY
Qty: 90 TABLET | Refills: 3 | Status: SHIPPED | OUTPATIENT
Start: 2019-09-27 | End: 2019-12-20

## 2019-09-27 RX ORDER — AMLODIPINE BESYLATE 5 MG/1
5 TABLET ORAL DAILY
Qty: 90 TABLET | Refills: 3 | Status: SHIPPED | OUTPATIENT
Start: 2019-09-27 | End: 2020-09-28 | Stop reason: SDUPTHER

## 2019-09-27 RX ORDER — CHLORTHALIDONE 25 MG/1
25 TABLET ORAL DAILY
Qty: 90 TABLET | Refills: 3 | Status: SHIPPED | OUTPATIENT
Start: 2019-09-27 | End: 2020-09-28 | Stop reason: SDUPTHER

## 2019-09-27 NOTE — PROGRESS NOTES
[facility] at [facility]  Follow-up note    Encounter Date:09/27/2019    Patient ID: Ed Geller is a  69 y.o. male who resides in [place], [place].    CC/Reason for visit:  Coronary Artery Disease           Problem List Items Addressed This Visit        Cardiology Problems    Coronary artery disease involving native coronary artery of native heart with angina pectoris (CMS/HCC) - Primary    Overview     · Cardiac catheterization at [facility] (2010):  60% mid-LAD stenosis, normal LVEF.  · Pharmacologic nuclear stress (10/23/2015):  Mild apical ischemia, normal LVEF.  Low risk study.          Current Assessment & Plan     · No angina  · Continue low dose aspirin, beta blocker, and statin         Relevant Medications    aspirin 81 MG tablet    metoprolol tartrate (LOPRESSOR) 25 MG tablet    amLODIPine (NORVASC) 5 MG tablet    Other Relevant Orders    CBC (No Diff)    Essential hypertension    Current Assessment & Plan     · Relatively controlled  · Continue present medical therapy         Relevant Medications    metoprolol tartrate (LOPRESSOR) 25 MG tablet    amLODIPine (NORVASC) 5 MG tablet    chlorthalidone (HYGROTON) 25 MG tablet    Hyperlipidemia LDL goal <70    Overview     · High intensity statin therapy indicated.         Current Assessment & Plan     · Obtain CMP and lipids  · Continue rosuvastatin         Relevant Medications    rosuvastatin (CRESTOR) 10 MG tablet    Other Relevant Orders    Comprehensive Metabolic Panel    Lipid Panel               · Obtain CMP, lipids, CBC  · If LDL >70, increase rosuvastatin  Return in about 12 months (around 9/27/2020) for Follow-up with [facility] only.            Ed Geller is a 69 y.o. male who returns to my office for follow-up of his coronary artery disease, hypertension, and hyperlipidemia.  Patient's been doing well from a cardiovascular standpoint.  He has been attempting weight loss recently.  He states his blood pressure typically runs around  130-140, which is better than it has been historically.  He denies any exertional chest discomfort to suggest angina.  He has not had any blood work performed since his last visit.  He is presently trying to establish with a primary care provider.  He has not had labs performed in the last year.    Review of Systems   Constitution: Positive for malaise/fatigue. Negative for weakness.   Eyes: Negative for vision loss in left eye and vision loss in right eye.   Cardiovascular: Negative for chest pain, dyspnea on exertion, near-syncope, orthopnea, palpitations, paroxysmal nocturnal dyspnea and syncope.   Skin: Positive for skin cancer.   Musculoskeletal: Negative for myalgias.   Neurological: Negative for brief paralysis, excessive daytime sleepiness, focal weakness, numbness and paresthesias.   All other systems reviewed and are negative.      The patient's past medical, social, family history and ROS reviewed in the patient's electronic medical record.    Allergies  Ciprofloxacin; Losartan; Sulfur; Atorvastatin; and Lisinopril    Outpatient Medications Marked as Taking for the 9/27/19 encounter (Office Visit) with Carlos Richter IV, MD   Medication Sig Dispense Refill   • aspirin 81 MG tablet Take 1 tablet by mouth Daily. 90 tablet 3   • cetirizine (zyrTEC) 10 MG tablet Take 10 mg by mouth Daily.     • Cyanocobalamin (VITAMIN B12 PO) Take 5,000 mcg by mouth Daily.     • GLUCOSAMINE-CHONDROITIN PO Take 1 tablet by mouth Daily.     • metoprolol tartrate (LOPRESSOR) 25 MG tablet Take 1 tablet by mouth Every 12 (Twelve) Hours. 180 tablet 3   • Multiple Vitamins-Minerals (CENTRUM SILVER) tablet Take 1 tablet by mouth Daily.     • nitroglycerin (NITROSTAT) 0.4 MG SL tablet 1 under the tongue as needed for angina, may repeat q5mins for up three doses 25 tablet 5   • omeprazole (priLOSEC) 40 MG capsule Take 40 mg by mouth Daily.     • rosuvastatin (CRESTOR) 10 MG tablet Take 1 tablet by mouth Daily. 90 tablet 3  "  • [DISCONTINUED] aspirin 81 MG tablet Take 1 tablet by mouth Daily. 90 tablet 3   • [DISCONTINUED] metoprolol tartrate (LOPRESSOR) 25 MG tablet Take 1 tablet by mouth Every 12 (Twelve) Hours. 180 tablet 3   • [DISCONTINUED] rosuvastatin (CRESTOR) 10 MG tablet Take 1 tablet by mouth Daily. 90 tablet 3           Blood pressure 136/86, pulse 72, height 175.3 cm (69\"), weight 122 kg (270 lb), SpO2 93 %.  Body mass index is 39.87 kg/m².  Vitals:    09/27/19 1517   Patient Position: Sitting       Physical Exam   Constitutional: He is oriented to person, place, and time. He appears well-developed and well-nourished.   HENT:   Head: Normocephalic and atraumatic.   Eyes: Pupils are equal, round, and reactive to light. No scleral icterus.   Neck: No JVD present. Carotid bruit is not present. No thyromegaly present.   Cardiovascular: Normal rate, regular rhythm, S1 normal and S2 normal. Exam reveals no gallop.   No murmur heard.  Pulmonary/Chest: Effort normal and breath sounds normal.   Abdominal: Soft. There is no hepatosplenomegaly. There is no tenderness.   Neurological: He is alert and oriented to person, place, and time.   Skin: Skin is warm and dry. No cyanosis. Nails show no clubbing.   Psychiatric: He has a normal mood and affect. His behavior is normal.       Data Review (reviewed with patient):     Mary Richter MD [facility], [facility]  Interventional and General Cardiology      "

## 2019-11-21 ENCOUNTER — TELEPHONE (OUTPATIENT)
Dept: CARDIOLOGY | Facility: CLINIC | Age: 69
End: 2019-11-21

## 2019-11-21 NOTE — TELEPHONE ENCOUNTER
The patient was supposed to have a CBC, CMP and lipid profile that was ordered at his last clinic visit.  This is not been performed.  I will have our nurse send a reminder.    ELY Sellers

## 2019-12-18 ENCOUNTER — TELEPHONE (OUTPATIENT)
Dept: CARDIOLOGY | Facility: CLINIC | Age: 69
End: 2019-12-18

## 2019-12-18 NOTE — TELEPHONE ENCOUNTER
Patient's wife requested lab orders be faxed to [facility] in [place]. Sent to [phone]. Patient verbalized understanding.

## 2019-12-20 DIAGNOSIS — I25.119 CORONARY ARTERY DISEASE INVOLVING NATIVE CORONARY ARTERY OF NATIVE HEART WITH ANGINA PECTORIS (HCC): ICD-10-CM

## 2019-12-20 DIAGNOSIS — I10 ESSENTIAL HYPERTENSION: ICD-10-CM

## 2019-12-20 DIAGNOSIS — E78.5 HYPERLIPIDEMIA LDL GOAL <70: ICD-10-CM

## 2019-12-20 RX ORDER — ROSUVASTATIN CALCIUM 10 MG/1
TABLET, COATED ORAL
Qty: 90 TABLET | Refills: 2 | Status: SHIPPED | OUTPATIENT
Start: 2019-12-20 | End: 2020-09-28 | Stop reason: SDUPTHER

## 2020-09-28 ENCOUNTER — TELEPHONE (OUTPATIENT)
Dept: CARDIOLOGY | Facility: CLINIC | Age: 70
End: 2020-09-28

## 2020-09-28 DIAGNOSIS — I10 ESSENTIAL HYPERTENSION: ICD-10-CM

## 2020-09-28 DIAGNOSIS — E78.5 HYPERLIPIDEMIA LDL GOAL <70: ICD-10-CM

## 2020-09-28 DIAGNOSIS — I25.119 CORONARY ARTERY DISEASE INVOLVING NATIVE CORONARY ARTERY OF NATIVE HEART WITH ANGINA PECTORIS (HCC): ICD-10-CM

## 2020-09-28 RX ORDER — AMLODIPINE BESYLATE 5 MG/1
5 TABLET ORAL DAILY
Qty: 90 TABLET | Refills: 3 | Status: SHIPPED | OUTPATIENT
Start: 2020-09-28 | End: 2020-11-06 | Stop reason: SDUPTHER

## 2020-09-28 RX ORDER — ROSUVASTATIN CALCIUM 10 MG/1
10 TABLET, COATED ORAL DAILY
Qty: 90 TABLET | Refills: 2 | Status: SHIPPED | OUTPATIENT
Start: 2020-09-28 | End: 2020-11-06 | Stop reason: SDUPTHER

## 2020-09-28 RX ORDER — CHLORTHALIDONE 25 MG/1
25 TABLET ORAL DAILY
Qty: 90 TABLET | Refills: 3 | Status: SHIPPED | OUTPATIENT
Start: 2020-09-28 | End: 2020-11-06 | Stop reason: SDUPTHER

## 2020-09-28 NOTE — TELEPHONE ENCOUNTER
Patient called reporting he has had a burst blood vessel in his eyes for about 2 weeks. /86, HR 82 before medications and after 135/75. Told him he could take an extra amlodipine if BP became elevated but otherwise seek eye doctor or PCP. He reports he has had surgery on his eyes less than a year ago. He verbalized understanding.

## 2020-11-06 ENCOUNTER — OFFICE VISIT (OUTPATIENT)
Dept: CARDIOLOGY | Facility: CLINIC | Age: 70
End: 2020-11-06

## 2020-11-06 VITALS
SYSTOLIC BLOOD PRESSURE: 140 MMHG | HEART RATE: 71 BPM | WEIGHT: 243 LBS | BODY MASS INDEX: 34.79 KG/M2 | DIASTOLIC BLOOD PRESSURE: 76 MMHG | HEIGHT: 70 IN | OXYGEN SATURATION: 96 %

## 2020-11-06 DIAGNOSIS — R73.09 ELEVATED GLUCOSE: ICD-10-CM

## 2020-11-06 DIAGNOSIS — I10 ESSENTIAL HYPERTENSION: ICD-10-CM

## 2020-11-06 DIAGNOSIS — I25.119 CORONARY ARTERY DISEASE INVOLVING NATIVE CORONARY ARTERY OF NATIVE HEART WITH ANGINA PECTORIS (HCC): Primary | ICD-10-CM

## 2020-11-06 DIAGNOSIS — E78.5 HYPERLIPIDEMIA LDL GOAL <70: ICD-10-CM

## 2020-11-06 PROCEDURE — 99214 OFFICE O/P EST MOD 30 MIN: CPT | Performed by: INTERNAL MEDICINE

## 2020-11-06 RX ORDER — OMEPRAZOLE 40 MG/1
40 CAPSULE, DELAYED RELEASE ORAL DAILY
Qty: 90 CAPSULE | Refills: 3 | Status: SHIPPED | OUTPATIENT
Start: 2020-11-06 | End: 2021-11-10

## 2020-11-06 RX ORDER — CHLORTHALIDONE 25 MG/1
25 TABLET ORAL DAILY
Qty: 90 TABLET | Refills: 3 | Status: SHIPPED | OUTPATIENT
Start: 2020-11-06 | End: 2021-11-10

## 2020-11-06 RX ORDER — ROSUVASTATIN CALCIUM 10 MG/1
10 TABLET, COATED ORAL DAILY
Qty: 90 TABLET | Refills: 3 | Status: SHIPPED | OUTPATIENT
Start: 2020-11-06 | End: 2020-12-23

## 2020-11-06 RX ORDER — AMLODIPINE BESYLATE 5 MG/1
5 TABLET ORAL DAILY
Qty: 90 TABLET | Refills: 3 | Status: SHIPPED | OUTPATIENT
Start: 2020-11-06 | End: 2021-11-10 | Stop reason: SDUPTHER

## 2020-11-06 RX ORDER — ASPIRIN 81 MG/1
81 TABLET ORAL DAILY
Qty: 90 TABLET | Refills: 3 | Status: SHIPPED | OUTPATIENT
Start: 2020-11-06

## 2020-11-06 NOTE — PROGRESS NOTES
[facility] at [facility]  Office Visit Note    DATE: 11/06/2020    IDENTIFICATION: Ed Geller is a  70 y.o. male who resides in [place], [place].    REASON FOR VISIT:  · Coronary artery disease  · Hypertension  · Hyperlipidemia            Ed Geller returns for routine follow-up.  The patient denies any issues since his last visit 1 year ago.  He denies angina or heart failure symptoms.  He does not follow with a primary care provider.  Has not had labs performed in the last year.  He does not routinely check his blood pressure.    Review of Systems   Constitution: Negative for malaise/fatigue.   Eyes: Negative for vision loss in left eye and vision loss in right eye.   Cardiovascular: Negative for chest pain, dyspnea on exertion, near-syncope, orthopnea, palpitations, paroxysmal nocturnal dyspnea and syncope.   Musculoskeletal: Negative for myalgias.   Neurological: Negative for brief paralysis, excessive daytime sleepiness, focal weakness, numbness, paresthesias and weakness.   All other systems reviewed and are negative.      The patient's past medical, social, family history and ROS reviewed in the patient's electronic medical record.    Allergies   Allergen Reactions   • Ciprofloxacin Other (See Comments)     Caused achilles tendon to become detached.    • Losartan Other (See Comments)     Dizziness   • Sulfur Swelling   • Atorvastatin Rash   • Lisinopril Rash         Current Outpatient Medications:   •  amLODIPine (NORVASC) 5 MG tablet, Take 1 tablet by mouth Daily., Disp: 90 tablet, Rfl: 3  •  cetirizine (zyrTEC) 10 MG tablet, Take 10 mg by mouth Daily., Disp: , Rfl:   •  chlorthalidone (HYGROTON) 25 MG tablet, Take 1 tablet by mouth Daily., Disp: 90 tablet, Rfl: 3  •  Cyanocobalamin (VITAMIN B12 PO), Take 5,000 mcg by mouth Daily., Disp: , Rfl:   •  GLUCOSAMINE-CHONDROITIN PO, Take 1 tablet by mouth Daily., Disp: , Rfl:   •  metoprolol tartrate (LOPRESSOR) 25 MG tablet, Take 1  "tablet by mouth Every 12 (Twelve) Hours., Disp: 180 tablet, Rfl: 3  •  Multiple Vitamins-Minerals (CENTRUM SILVER) tablet, Take 1 tablet by mouth Daily., Disp: , Rfl:   •  nitroglycerin (NITROSTAT) 0.4 MG SL tablet, 1 under the tongue as needed for angina, may repeat q5mins for up three doses, Disp: 25 tablet, Rfl: 5  •  omeprazole (priLOSEC) 40 MG capsule, Take 1 capsule by mouth Daily., Disp: 90 capsule, Rfl: 3  •  rosuvastatin (CRESTOR) 10 MG tablet, Take 1 tablet by mouth Daily., Disp: 90 tablet, Rfl: 3  •  aspirin (aspirin) 81 MG EC tablet, Take 1 tablet by mouth Daily., Disp: 90 tablet, Rfl: 3    Past Medical History:   Diagnosis Date   • Coronary artery disease 11/30/2016   • Gastroesophageal reflux disease 11/30/2016   • Hiatal hernia 11/30/2016   • History of shingles    • Hyperlipidemia 11/30/2016   • Hypertension 11/30/2016   • Nephrolithiasis 11/30/2016       Past Surgical History:   Procedure Laterality Date   • CATARACT EXTRACTION  2020    both eyes    • EYE SURGERY  2020   • FOOT SURGERY      History of foot surgeries, [id number].   • NISSEN FUNDOPLICATION  2010     with gastric sleeve surgery       Family History   Problem Relation Age of Onset   • Diabetes Mother    • No Known Problems Father        Social History     Tobacco Use   • Smoking status: Never Smoker   • Smokeless tobacco: Never Used   Substance Use Topics   • Alcohol use: No           Blood pressure 140/76, pulse 71, height 177.8 cm (70\"), weight 110 kg (243 lb), SpO2 96 %.  Body mass index is 34.87 kg/m².  Vitals:    11/06/20 1400   Patient Position: Sitting       Constitutional:       Appearance: Well-developed.   Eyes:      General: No scleral icterus.     Pupils: Pupils are equal, round, and reactive to light.   HENT:      Head: Normocephalic and atraumatic.   Neck:      Thyroid: No thyromegaly.      Vascular: No carotid bruit or JVD.   Pulmonary:      Effort: Pulmonary effort is normal.      Breath sounds: Normal breath sounds. "   Cardiovascular:      Normal rate. Regular rhythm.      Murmurs: There is no murmur.      No gallop.   Abdominal:      Palpations: Abdomen is soft. There is no abdominal mass.      Tenderness: There is no abdominal tenderness.   Musculoskeletal:      Extremities: No clubbing present.  Skin:     General: Skin is warm and dry. There is no cyanosis.   Neurological:      Mental Status: Alert and oriented to person, place, and time.   Psychiatric:         Behavior: Behavior normal.         Data Review (reviewed with patient):     Procedures    Lipid panel, 19:  TC: 75  Tri  HDL: 33  LDL: 21       Problem List Items Addressed This Visit        Cardiology Problems    Coronary artery disease involving native coronary artery of native heart with angina pectoris (CMS/HCC) - Primary    Overview     · Cardiac catheterization at \[facility]"" (): Moderate mid LAD disease.  Normal LVEF  · Pharmacologic nuclear stress (10/23/2015):  Mild apical ischemia.  Normal LVEF         Current Assessment & Plan     · No anginal symptoms  · Continue low-dose aspirin, beta-blocker, and statin therapy         Relevant Medications    metoprolol tartrate (LOPRESSOR) 25 MG tablet    amLODIPine (NORVASC) 5 MG tablet    aspirin (aspirin) 81 MG EC tablet    Other Relevant Orders    CBC (No Diff)    Essential hypertension    Overview     • Target blood pressure <130/80 mmHg         Current Assessment & Plan     · Mildly elevated today's visit  · Continue chlorthalidone, amlodipine, metoprolol         Relevant Medications    metoprolol tartrate (LOPRESSOR) 25 MG tablet    amLODIPine (NORVASC) 5 MG tablet    chlorthalidone (HYGROTON) 25 MG tablet    Hyperlipidemia LDL goal <70    Overview     · High intensity statin therapy indicated.         Current Assessment & Plan     · Obtain CMP and lipids today         Relevant Medications    rosuvastatin (CRESTOR) 10 MG tablet    Other Relevant Orders    Comprehensive Metabolic Panel    Lipid Panel       Other Visit Diagnoses     Elevated glucose        Relevant Orders    Hemoglobin A1c               · Obtain CMP, lipids, CBC, hemoglobin A1c today  · Continue present medical therapy  Return in about 1 year (around 11/6/2021).      Carlos Richter IV MD, [facility], [facility]  11/6/2020

## 2020-11-07 LAB
ALBUMIN SERPL-MCNC: 4.6 G/DL (ref 3.5–5.2)
ALBUMIN/GLOB SERPL: 2.1 G/DL
ALP SERPL-CCNC: 53 U/L (ref 39–117)
ALT SERPL-CCNC: 18 U/L (ref 1–41)
AST SERPL-CCNC: 18 U/L (ref 1–40)
BILIRUB SERPL-MCNC: 0.3 MG/DL (ref 0–1.2)
BUN SERPL-MCNC: 22 MG/DL (ref 8–23)
BUN/CREAT SERPL: 27.2 (ref 7–25)
CALCIUM SERPL-MCNC: 9.4 MG/DL (ref 8.6–10.5)
CHLORIDE SERPL-SCNC: 99 MMOL/L (ref 98–107)
CHOLEST SERPL-MCNC: 79 MG/DL (ref 0–200)
CO2 SERPL-SCNC: 29.8 MMOL/L (ref 22–29)
CREAT SERPL-MCNC: 0.81 MG/DL (ref 0.76–1.27)
ERYTHROCYTE [DISTWIDTH] IN BLOOD BY AUTOMATED COUNT: 13.3 % (ref 12.3–15.4)
GLOBULIN SER CALC-MCNC: 2.2 GM/DL
GLUCOSE SERPL-MCNC: 78 MG/DL (ref 65–99)
HBA1C MFR BLD: 6.2 % (ref 4.8–5.6)
HCT VFR BLD AUTO: 42.5 % (ref 37.5–51)
HDLC SERPL-MCNC: 36 MG/DL (ref 40–60)
HGB BLD-MCNC: 14.5 G/DL (ref 13–17.7)
LDLC SERPL CALC-MCNC: 16 MG/DL (ref 0–100)
MCH RBC QN AUTO: 30.2 PG (ref 26.6–33)
MCHC RBC AUTO-ENTMCNC: 34.1 G/DL (ref 31.5–35.7)
MCV RBC AUTO: 88.5 FL (ref 79–97)
PLATELET # BLD AUTO: 248 10*3/MM3 (ref 140–450)
POTASSIUM SERPL-SCNC: 3.8 MMOL/L (ref 3.5–5.2)
PROT SERPL-MCNC: 6.8 G/DL (ref 6–8.5)
RBC # BLD AUTO: 4.8 10*6/MM3 (ref 4.14–5.8)
SODIUM SERPL-SCNC: 139 MMOL/L (ref 136–145)
TRIGL SERPL-MCNC: 166 MG/DL (ref 0–150)
VLDLC SERPL CALC-MCNC: 27 MG/DL (ref 5–40)
WBC # BLD AUTO: 9.96 10*3/MM3 (ref 3.4–10.8)

## 2020-12-23 DIAGNOSIS — I10 ESSENTIAL HYPERTENSION: ICD-10-CM

## 2020-12-23 DIAGNOSIS — E78.5 HYPERLIPIDEMIA LDL GOAL <70: ICD-10-CM

## 2020-12-23 DIAGNOSIS — I25.119 CORONARY ARTERY DISEASE INVOLVING NATIVE CORONARY ARTERY OF NATIVE HEART WITH ANGINA PECTORIS (HCC): ICD-10-CM

## 2020-12-23 RX ORDER — ROSUVASTATIN CALCIUM 10 MG/1
TABLET, COATED ORAL
Qty: 90 TABLET | Refills: 2 | Status: SHIPPED | OUTPATIENT
Start: 2020-12-23 | End: 2021-09-29

## 2021-05-14 ENCOUNTER — TRANSCRIBE ORDERS (OUTPATIENT)
Dept: ADMINISTRATIVE | Facility: HOSPITAL | Age: 71
End: 2021-05-14

## 2021-05-14 DIAGNOSIS — R01.1 HEART MURMUR: Primary | ICD-10-CM

## 2021-05-25 ENCOUNTER — HOSPITAL ENCOUNTER (OUTPATIENT)
Dept: CARDIOLOGY | Facility: HOSPITAL | Age: 71
Discharge: HOME | End: 2021-05-25
Admitting: FAMILY MEDICINE

## 2021-05-25 DIAGNOSIS — R01.1 HEART MURMUR: ICD-10-CM

## 2021-05-25 LAB
BH CV ECHO MEAS - ACS: 1.9 CM
BH CV ECHO MEAS - AO ROOT AREA (BSA CORRECTED): 1.5
BH CV ECHO MEAS - AO ROOT AREA: 9.6 CM^2
BH CV ECHO MEAS - AO ROOT DIAM: 3.5 CM
BH CV ECHO MEAS - BSA(HAYCOCK): 2.4 M^2
BH CV ECHO MEAS - BSA: 2.3 M^2
BH CV ECHO MEAS - BZI_BMI: 34.9 KILOGRAMS/M^2
BH CV ECHO MEAS - BZI_METRIC_HEIGHT: 177.8 CM
BH CV ECHO MEAS - BZI_METRIC_WEIGHT: 110.2 KG
BH CV ECHO MEAS - EDV(CUBED): 125 ML
BH CV ECHO MEAS - EDV(MOD-SP4): 95.9 ML
BH CV ECHO MEAS - EDV(TEICH): 118.2 ML
BH CV ECHO MEAS - EF(CUBED): 16.9 %
BH CV ECHO MEAS - EF(MOD-SP4): 65.6 %
BH CV ECHO MEAS - EF(TEICH): 13.4 %
BH CV ECHO MEAS - ESV(CUBED): 103.8 ML
BH CV ECHO MEAS - ESV(MOD-SP4): 33 ML
BH CV ECHO MEAS - ESV(TEICH): 102.4 ML
BH CV ECHO MEAS - FS: 6 %
BH CV ECHO MEAS - IVS/LVPW: 1
BH CV ECHO MEAS - IVSD: 1.7 CM
BH CV ECHO MEAS - LA DIMENSION: 5.3 CM
BH CV ECHO MEAS - LA/AO: 1.5
BH CV ECHO MEAS - LV DIASTOLIC VOL/BSA (35-75): 42.3 ML/M^2
BH CV ECHO MEAS - LV MASS(C)D: 389.7 GRAMS
BH CV ECHO MEAS - LV MASS(C)DI: 171.9 GRAMS/M^2
BH CV ECHO MEAS - LV SYSTOLIC VOL/BSA (12-30): 14.6 ML/M^2
BH CV ECHO MEAS - LVIDD: 5 CM
BH CV ECHO MEAS - LVIDS: 4.7 CM
BH CV ECHO MEAS - LVLD AP4: 8.5 CM
BH CV ECHO MEAS - LVLS AP4: 8.4 CM
BH CV ECHO MEAS - LVOT AREA (M): 4.2 CM^2
BH CV ECHO MEAS - LVOT AREA: 4.2 CM^2
BH CV ECHO MEAS - LVOT DIAM: 2.3 CM
BH CV ECHO MEAS - LVPWD: 1.7 CM
BH CV ECHO MEAS - MV A MAX VEL: 83.1 CM/SEC
BH CV ECHO MEAS - MV E MAX VEL: 60.8 CM/SEC
BH CV ECHO MEAS - MV E/A: 0.73
BH CV ECHO MEAS - PA ACC TIME: 0.13 SEC
BH CV ECHO MEAS - PA PR(ACCEL): 21.9 MMHG
BH CV ECHO MEAS - SI(CUBED): 9.3 ML/M^2
BH CV ECHO MEAS - SI(MOD-SP4): 27.7 ML/M^2
BH CV ECHO MEAS - SI(TEICH): 7 ML/M^2
BH CV ECHO MEAS - SV(CUBED): 21.2 ML
BH CV ECHO MEAS - SV(MOD-SP4): 62.9 ML
BH CV ECHO MEAS - SV(TEICH): 15.9 ML
MAXIMAL PREDICTED HEART RATE: 148 BPM
STRESS TARGET HR: 126 BPM

## 2021-05-25 PROCEDURE — 93306 TTE W/DOPPLER COMPLETE: CPT | Performed by: SPECIALIST

## 2021-05-25 PROCEDURE — 93306 TTE W/DOPPLER COMPLETE: CPT

## 2021-09-29 DIAGNOSIS — E78.5 HYPERLIPIDEMIA LDL GOAL <70: ICD-10-CM

## 2021-09-29 DIAGNOSIS — I25.119 CORONARY ARTERY DISEASE INVOLVING NATIVE CORONARY ARTERY OF NATIVE HEART WITH ANGINA PECTORIS (HCC): ICD-10-CM

## 2021-09-29 DIAGNOSIS — I10 ESSENTIAL HYPERTENSION: ICD-10-CM

## 2021-09-29 RX ORDER — ROSUVASTATIN CALCIUM 10 MG/1
TABLET, COATED ORAL
Qty: 90 TABLET | Refills: 2 | Status: SHIPPED | OUTPATIENT
Start: 2021-09-29 | End: 2021-11-10 | Stop reason: SDUPTHER

## 2021-11-09 NOTE — PROGRESS NOTES
"[facility]iology      Identification: Ed Geller is a 71 y.o. male who resides in [place], [place].    Reason for visit:  Coronary Artery Disease       Subjective      Ed Geller presents to [facility] for followup.    Ed states that he is doing well from a cardiac standpoint.  He denies angina or heart failure symptoms.  His major complaint today is that of polyarthritis.  He developed rather sudden onset pain in both of his thumbs as well as both of his knees.  He reports that these discomforts occurred rather suddenly about 6 months ago.  Prior to that, he had not experienced much in the way of arthritis symptoms.  He discussed the symptoms with Dr. Martin and was referred to a specialist (orthopedic surgeon versus rheumatologist?).  Ed states that this specialist told him that he does not have arthritis and to take ibuprofen.  The patient is not satisfied with this assessment and asked for a second opinion.  He states his knee pain is greatly limiting his mobility and he states that he cannot open a jar due to thumb pain.            Review of Systems   All other systems reviewed and are negative.      Objective     /80 (BP Location: Right arm, Patient Position: Sitting)   Pulse 72   Ht 177.8 cm (70\")   Wt 121 kg (266 lb)   SpO2 96%   BMI 38.17 kg/m²       Constitutional:       Appearance: Healthy appearance. Well-developed.   Eyes:      General: No scleral icterus.  HENT:      Head: Normocephalic and atraumatic.   Neck:      Vascular: No carotid bruit or JVD. JVD normal.   Pulmonary:      Effort: Pulmonary effort is normal.      Breath sounds: Normal breath sounds.   Cardiovascular:      Normal rate. Regular rhythm.      Murmurs: There is no murmur.      No gallop.   Musculoskeletal:      Extremities: No clubbing present.Skin:     General: Skin is warm and dry. There is no cyanosis.   Neurological:      Mental Status: Alert.   Psychiatric:         Attention and Perception: " Attention normal.         Behavior: Behavior normal.         Result Review :    Labs (11/4/2021):  · HA1C 5.8  · Chol 73, Trig 169, HDL 30, LDL 15  · Creat 0.90, BUN 21, Na 141, K 4.6, ALT 15, AST 15  · WBC 8.6, RBC 4.33, HGB 14.2, HCT 40.3,     ECG 12 Lead    Date/Time: 11/10/2021 1:50 PM  Performed by: Carlos Richter IV, MD  Authorized by: Carlos Richter IV, MD   Comparison: not compared with previous ECG   Rhythm: sinus rhythm  BPM: 68  Q waves: III and aVF    Comments: Possible inferior infarct.  Low voltages in precordial leads             Assessment     Problem List Items Addressed This Visit        Cardiology Problems    Coronary artery disease involving native coronary artery of native heart with angina pectoris (HCC) - Primary (Chronic)    Overview     · Cardiac catheterization at [facility] (2010): Moderate mid LAD disease.  Normal LVEF  · Pharmacologic nuclear stress (10/23/2015):  Mild apical ischemia.  Normal LVEF  · Echo at [facility] (5/25/2021): LVEF 61-65%.  Severe LVH.         Current Assessment & Plan     · No anginal symptoms  · Continue low-dose aspirin, beta-blocker, and statin therapy         Relevant Medications    amLODIPine (NORVASC) 5 MG tablet    metoprolol tartrate (LOPRESSOR) 50 MG tablet    Other Relevant Orders    ECG 12 Lead    Essential hypertension (Chronic)    Overview     • Target blood pressure <130/80 mmHg         Current Assessment & Plan     · Mildly elevated  · Continue present medical therapy for now         Relevant Medications    spironolactone (ALDACTONE) 25 MG tablet    valsartan (DIOVAN) 160 MG tablet    amLODIPine (NORVASC) 5 MG tablet    metoprolol tartrate (LOPRESSOR) 50 MG tablet    Hyperlipidemia LDL goal <70 (Chronic)    Overview     · High intensity statin therapy indicated.         Current Assessment & Plan     · Controlled  · Continue rosuvastatin         Relevant Medications    rosuvastatin (CRESTOR) 10 MG tablet       Other    Inflammatory  polyarthropathy (HCC)    Overview     · Rather acute onset bilateral knee and thumb arthritis, 2021         Current Assessment & Plan     · Bilateral nature and acute onset of arthritis like pain concerning for rheumatologic etiology  · We will refer to [place] rheumatologist for opinion         Relevant Orders    Ambulatory Referral to Rheumatology          Plan   • Refer to rheumatology for second opinion regarding bilateral polyarthritis of abrupt onset      Follow-up   Return in about 8 months (around 7/10/2022).        Florin Richter MD, FACC, FSCAI  11/10/2021

## 2021-11-10 ENCOUNTER — OFFICE VISIT (OUTPATIENT)
Dept: CARDIOLOGY | Facility: CLINIC | Age: 71
End: 2021-11-10

## 2021-11-10 VITALS
HEIGHT: 70 IN | DIASTOLIC BLOOD PRESSURE: 80 MMHG | SYSTOLIC BLOOD PRESSURE: 136 MMHG | WEIGHT: 266 LBS | BODY MASS INDEX: 38.08 KG/M2 | OXYGEN SATURATION: 96 % | HEART RATE: 72 BPM

## 2021-11-10 DIAGNOSIS — I10 ESSENTIAL HYPERTENSION: ICD-10-CM

## 2021-11-10 DIAGNOSIS — I25.119 CORONARY ARTERY DISEASE INVOLVING NATIVE CORONARY ARTERY OF NATIVE HEART WITH ANGINA PECTORIS (HCC): Primary | ICD-10-CM

## 2021-11-10 DIAGNOSIS — E78.5 HYPERLIPIDEMIA LDL GOAL <70: ICD-10-CM

## 2021-11-10 DIAGNOSIS — M06.4 INFLAMMATORY POLYARTHROPATHY (HCC): ICD-10-CM

## 2021-11-10 PROCEDURE — 99214 OFFICE O/P EST MOD 30 MIN: CPT | Performed by: INTERNAL MEDICINE

## 2021-11-10 PROCEDURE — 93000 ELECTROCARDIOGRAM COMPLETE: CPT | Performed by: INTERNAL MEDICINE

## 2021-11-10 RX ORDER — OMEPRAZOLE 20 MG/1
1 TABLET, DELAYED RELEASE ORAL
COMMUNITY
Start: 2021-05-12

## 2021-11-10 RX ORDER — SPIRONOLACTONE 25 MG/1
25 TABLET ORAL DAILY
Qty: 90 TABLET | Refills: 3 | Status: SHIPPED | OUTPATIENT
Start: 2021-11-10 | End: 2022-12-28

## 2021-11-10 RX ORDER — ROSUVASTATIN CALCIUM 10 MG/1
10 TABLET, COATED ORAL DAILY
Qty: 90 TABLET | Refills: 3 | Status: SHIPPED | OUTPATIENT
Start: 2021-11-10

## 2021-11-10 RX ORDER — SPIRONOLACTONE 25 MG/1
25 TABLET ORAL DAILY
COMMUNITY
Start: 2021-10-07 | End: 2021-11-10 | Stop reason: SDUPTHER

## 2021-11-10 RX ORDER — METOPROLOL TARTRATE 50 MG/1
50 TABLET, FILM COATED ORAL 2 TIMES DAILY
Qty: 180 TABLET | Refills: 3 | Status: SHIPPED | OUTPATIENT
Start: 2021-11-10

## 2021-11-10 RX ORDER — VALSARTAN 160 MG/1
160 TABLET ORAL DAILY
Qty: 90 TABLET | Refills: 3 | Status: SHIPPED | OUTPATIENT
Start: 2021-11-10 | End: 2022-12-28

## 2021-11-10 RX ORDER — AMLODIPINE BESYLATE 5 MG/1
5 TABLET ORAL DAILY
Qty: 90 TABLET | Refills: 3 | Status: SHIPPED | OUTPATIENT
Start: 2021-11-10 | End: 2023-02-07

## 2021-11-10 RX ORDER — VALSARTAN 160 MG/1
160 TABLET ORAL DAILY
COMMUNITY
Start: 2021-08-26 | End: 2021-11-10 | Stop reason: SDUPTHER

## 2021-11-10 RX ORDER — OMEPRAZOLE 20 MG/1
CAPSULE, DELAYED RELEASE ORAL
COMMUNITY
Start: 2021-10-07 | End: 2021-11-10

## 2021-11-10 NOTE — ASSESSMENT & PLAN NOTE
· Bilateral nature and acute onset of arthritis like pain concerning for rheumatologic etiology  · We will refer to [place] rheumatologist for opinion

## 2022-09-02 ENCOUNTER — OFFICE VISIT (OUTPATIENT)
Dept: CARDIOLOGY | Facility: CLINIC | Age: 72
End: 2022-09-02

## 2022-09-02 VITALS
WEIGHT: 261 LBS | DIASTOLIC BLOOD PRESSURE: 78 MMHG | SYSTOLIC BLOOD PRESSURE: 138 MMHG | BODY MASS INDEX: 37.37 KG/M2 | OXYGEN SATURATION: 96 % | HEART RATE: 77 BPM | HEIGHT: 70 IN

## 2022-09-02 DIAGNOSIS — I25.119 CORONARY ARTERY DISEASE INVOLVING NATIVE CORONARY ARTERY OF NATIVE HEART WITH ANGINA PECTORIS: Primary | Chronic | ICD-10-CM

## 2022-09-02 DIAGNOSIS — E78.5 HYPERLIPIDEMIA LDL GOAL <70: Chronic | ICD-10-CM

## 2022-09-02 DIAGNOSIS — I10 ESSENTIAL HYPERTENSION: Chronic | ICD-10-CM

## 2022-09-02 PROCEDURE — 99214 OFFICE O/P EST MOD 30 MIN: CPT | Performed by: INTERNAL MEDICINE

## 2022-09-02 NOTE — PROGRESS NOTES
[facility] Cardiology      Identification: Ed Geller is a 72 y.o. male who resides in [place], [place].    Reason for visit:  Coronary artery disease involving native coronary artery of    Assessment     Problem List Items Addressed This Visit        Cardiology Problems    Coronary artery disease involving native coronary artery of native heart with angina pectoris (HCC) - Primary    Overview     · Cardiac catheterization at [facility] (2010): Moderate mid LAD disease.  Normal LVEF  · Pharmacologic nuclear stress (10/23/2015):  Mild apical ischemia.  Normal LVEF  · Echo at [facility] (5/25/2021): LVEF 62%.         Current Assessment & Plan     · No clear-cut anginal symptoms, but exertional fatigue may be anginal equivalent  · History of moderate LAD disease  · Exercise nuclear stress test recommended  · Continue low-dose aspirin beta-blocker, statin         Relevant Orders    Stress Test With Myocardial Perfusion (1 Day)    CBC (No Diff)    Essential hypertension    Overview     • Target blood pressure <130/80 mmHg         Current Assessment & Plan     · Mildly elevated  · Continue present medical therapy         Hyperlipidemia LDL goal <70    Overview     · High intensity statin therapy indicated.         Current Assessment & Plan     · Stain CMP, lipids,         Relevant Orders    Comprehensive Metabolic Panel    LDL Cholesterol, Direct    Lipid Panel          Plan   • Exercise nuclear stress  • Obtain CMP, lipids, CBC,      Follow-up   Return in about 6 months (around 3/2/2023).        Subjective      Ed returns to the office today.  Overall he has had a stable course with no recent hospitalizations.  He does report having increased fatigue over the last year.  He denies substernal chest discomfort, jaw pain, or arm radiation.  He just is not able to exert himself the way that he has in the past without fatigue.  Last ischemic evaluation was in 2015.  Echocardiogram performed last year showed normal LV systolic function and  "no significant valve abnormality.  Of note, severe left ventricular hypertrophy was reported on that echo which I do not appreciate on my review.    Review of Systems   Constitutional: Positive for malaise/fatigue.       Objective     /78 (BP Location: Right arm, Patient Position: Sitting)   Pulse 77   Ht 177.8 cm (70\")   Wt 118 kg (261 lb)   SpO2 96%   BMI 37.45 kg/m²       Constitutional:       Appearance: Healthy appearance. Well-developed.   Eyes:      General: Lids are normal. No scleral icterus.  HENT:      Head: Normocephalic and atraumatic.   Neck:      Thyroid: No thyroid mass.      Vascular: No carotid bruit or JVD. JVD normal.   Pulmonary:      Effort: Pulmonary effort is normal.      Breath sounds: Normal breath sounds.   Cardiovascular:      Normal rate. Regular rhythm.      Murmurs: There is no murmur.      No gallop.   Musculoskeletal:      Extremities: No clubbing present.Skin:     General: Skin is warm and dry. There is no cyanosis.   Neurological:      General: No focal deficit present.      Mental Status: Alert.   Psychiatric:         Attention and Perception: Attention normal.         Behavior: Behavior normal. Behavior is cooperative.         Result Review  (reviewed with patient):            Labs (11/4/2021):  · HA1C  5.8  · Chol 73, Trig 169, HDL 30, LDL 15  · Creat 0.90, BUN 21, K 4.6, Na 141, ALT 15, AST 15  · WBC 8.6, RBC 4.33, HGB 14.2, HCT 40.3,     Florin Richter MD, [facility], [facility]  9/2/2022  "

## 2022-09-02 NOTE — ASSESSMENT & PLAN NOTE
· No clear-cut anginal symptoms, but exertional fatigue may be anginal equivalent  · History of moderate LAD disease  · Exercise nuclear stress test recommended  · Continue low-dose aspirin beta-blocker, statin

## 2022-09-03 LAB
ALBUMIN SERPL-MCNC: 4.6 G/DL (ref 3.7–4.7)
ALBUMIN/GLOB SERPL: 1.9 {RATIO} (ref 1.2–2.2)
ALP SERPL-CCNC: 50 IU/L (ref 44–121)
ALT SERPL-CCNC: 15 IU/L (ref 0–44)
AST SERPL-CCNC: 16 IU/L (ref 0–40)
BILIRUB SERPL-MCNC: 0.3 MG/DL (ref 0–1.2)
BUN SERPL-MCNC: 27 MG/DL (ref 8–27)
BUN/CREAT SERPL: 29 (ref 10–24)
CALCIUM SERPL-MCNC: 9.8 MG/DL (ref 8.6–10.2)
CHLORIDE SERPL-SCNC: 101 MMOL/L (ref 96–106)
CHOLEST SERPL-MCNC: 104 MG/DL (ref 100–199)
CO2 SERPL-SCNC: 23 MMOL/L (ref 20–29)
CREAT SERPL-MCNC: 0.93 MG/DL (ref 0.76–1.27)
EGFRCR-CYS SERPLBLD CKD-EPI 2021: 87 ML/MIN/1.73
ERYTHROCYTE [DISTWIDTH] IN BLOOD BY AUTOMATED COUNT: 13.5 % (ref 11.6–15.4)
GLOBULIN SER CALC-MCNC: 2.4 G/DL (ref 1.5–4.5)
GLUCOSE SERPL-MCNC: 93 MG/DL (ref 65–99)
HCT VFR BLD AUTO: 41.4 % (ref 37.5–51)
HDLC SERPL-MCNC: 29 MG/DL
HGB BLD-MCNC: 13.9 G/DL (ref 13–17.7)
LDLC SERPL CALC-MCNC: 47 MG/DL (ref 0–99)
LDLC SERPL DIRECT ASSAY-MCNC: 50 MG/DL (ref 0–99)
MCH RBC QN AUTO: 30.5 PG (ref 26.6–33)
MCHC RBC AUTO-ENTMCNC: 33.6 G/DL (ref 31.5–35.7)
MCV RBC AUTO: 91 FL (ref 79–97)
PLATELET # BLD AUTO: 265 X10E3/UL (ref 150–450)
POTASSIUM SERPL-SCNC: 4.3 MMOL/L (ref 3.5–5.2)
PROT SERPL-MCNC: 7 G/DL (ref 6–8.5)
RBC # BLD AUTO: 4.55 X10E6/UL (ref 4.14–5.8)
SODIUM SERPL-SCNC: 141 MMOL/L (ref 134–144)
TRIGL SERPL-MCNC: 165 MG/DL (ref 0–149)
VLDLC SERPL CALC-MCNC: 28 MG/DL (ref 5–40)
WBC # BLD AUTO: 9.2 X10E3/UL (ref 3.4–10.8)

## 2022-10-05 ENCOUNTER — HOSPITAL ENCOUNTER (OUTPATIENT)
Dept: CARDIOLOGY | Facility: HOSPITAL | Age: 72
Discharge: HOME | End: 2022-10-05

## 2022-10-05 DIAGNOSIS — I25.119 CORONARY ARTERY DISEASE INVOLVING NATIVE CORONARY ARTERY OF NATIVE HEART WITH ANGINA PECTORIS: Chronic | ICD-10-CM

## 2022-10-17 ENCOUNTER — HOSPITAL ENCOUNTER (OUTPATIENT)
Dept: CARDIOLOGY | Facility: HOSPITAL | Age: 72
Discharge: HOME | End: 2022-10-17
Admitting: INTERNAL MEDICINE

## 2022-10-17 VITALS
HEART RATE: 57 BPM | DIASTOLIC BLOOD PRESSURE: 60 MMHG | SYSTOLIC BLOOD PRESSURE: 114 MMHG | HEIGHT: 70 IN | BODY MASS INDEX: 37.37 KG/M2 | OXYGEN SATURATION: 95 % | WEIGHT: 261 LBS

## 2022-10-17 PROCEDURE — 78452 HT MUSCLE IMAGE SPECT MULT: CPT | Performed by: INTERNAL MEDICINE

## 2022-10-17 PROCEDURE — 93018 CV STRESS TEST I&R ONLY: CPT | Performed by: INTERNAL MEDICINE

## 2022-10-17 PROCEDURE — 78452 HT MUSCLE IMAGE SPECT MULT: CPT

## 2022-10-17 PROCEDURE — 93017 CV STRESS TEST TRACING ONLY: CPT

## 2022-10-17 PROCEDURE — 25010000002 REGADENOSON 0.4 MG/5ML SOLUTION: Performed by: INTERNAL MEDICINE

## 2022-10-17 PROCEDURE — 0 TECHNETIUM SESTAMIBI: Performed by: INTERNAL MEDICINE

## 2022-10-17 PROCEDURE — A9500 TC99M SESTAMIBI: HCPCS | Performed by: INTERNAL MEDICINE

## 2022-10-17 RX ADMIN — REGADENOSON 0.4 MG: 0.08 INJECTION, SOLUTION INTRAVENOUS at 09:24

## 2022-10-17 RX ADMIN — TECHNETIUM TC 99M SESTAMIBI 1 DOSE: 1 INJECTION INTRAVENOUS at 07:50

## 2022-10-17 RX ADMIN — TECHNETIUM TC 99M SESTAMIBI 1 DOSE: 1 INJECTION INTRAVENOUS at 09:26

## 2022-10-18 ENCOUNTER — TELEPHONE (OUTPATIENT)
Dept: CARDIOLOGY | Facility: CLINIC | Age: 72
End: 2022-10-18

## 2022-10-18 DIAGNOSIS — I25.119 CORONARY ARTERY DISEASE INVOLVING NATIVE CORONARY ARTERY OF NATIVE HEART WITH ANGINA PECTORIS: Primary | ICD-10-CM

## 2022-10-18 DIAGNOSIS — R94.39 ABNORMAL NUCLEAR STRESS TEST: ICD-10-CM

## 2022-10-18 DIAGNOSIS — I10 ESSENTIAL HYPERTENSION: ICD-10-CM

## 2022-10-18 LAB
BH CV REST NUCLEAR ISOTOPE DOSE: 9.2 MCI
BH CV STRESS BP STAGE 2: NORMAL
BH CV STRESS BP STAGE 4: NORMAL
BH CV STRESS COMMENTS STAGE 1: NORMAL
BH CV STRESS DOSE REGADENOSON STAGE 1: 0.4
BH CV STRESS DURATION MIN STAGE 1: 1
BH CV STRESS DURATION MIN STAGE 2: 1
BH CV STRESS DURATION MIN STAGE 3: 1
BH CV STRESS DURATION MIN STAGE 4: 1
BH CV STRESS DURATION SEC STAGE 1: 10
BH CV STRESS DURATION SEC STAGE 2: 0
BH CV STRESS HR STAGE 1: 61
BH CV STRESS HR STAGE 2: 71
BH CV STRESS HR STAGE 3: 71
BH CV STRESS HR STAGE 4: 71
BH CV STRESS NUCLEAR ISOTOPE DOSE: 33 MCI
BH CV STRESS O2 STAGE 1: 93
BH CV STRESS O2 STAGE 2: 92
BH CV STRESS O2 STAGE 3: 96
BH CV STRESS O2 STAGE 4: 96
BH CV STRESS PROTOCOL 1: NORMAL
BH CV STRESS RECOVERY BP: NORMAL MMHG
BH CV STRESS RECOVERY HR: 68 BPM
BH CV STRESS RECOVERY O2: 95 %
BH CV STRESS STAGE 1: 1
BH CV STRESS STAGE 2: 2
BH CV STRESS STAGE 3: 3
BH CV STRESS STAGE 4: 4
LV EF NUC BP: 52 %
MAXIMAL PREDICTED HEART RATE: 147 BPM
PERCENT MAX PREDICTED HR: 51.7 %
STRESS BASELINE BP: NORMAL MMHG
STRESS BASELINE HR: 58 BPM
STRESS O2 SAT REST: 93 %
STRESS PERCENT HR: 61 %
STRESS POST ESTIMATED WORKLOAD: 1 METS
STRESS POST EXERCISE DUR MIN: 4 MIN
STRESS POST EXERCISE DUR SEC: 0 SEC
STRESS POST O2 SAT PEAK: 92 %
STRESS POST PEAK BP: NORMAL MMHG
STRESS POST PEAK HR: 76 BPM
STRESS TARGET HR: 125 BPM

## 2022-10-18 NOTE — TELEPHONE ENCOUNTER
----- Message from Carlos Richter IV, MD sent at 10/18/2022  2:17 PM EDT -----  Your stress test is abnormal and suggest the possibility of old heart attack or severe disease involving the bottom and side portion of your heart.  If you are still experiencing the symptoms you mentioned in the office, I think we should proceed with cardiac catheterization.

## 2022-10-18 NOTE — TELEPHONE ENCOUNTER
"Spoke with the patient and reviewed results. He reports he feels, \"puny\". Has chest pain at rest and exertion. Has not taken nitro but reports he has some if he needs it. He also feels weak and tired all the time. /70, HR 70. He agrees to proceed with a [facility]. Orders placed.   "

## 2022-10-27 PROBLEM — R94.39 ABNORMAL NUCLEAR STRESS TEST: Status: ACTIVE | Noted: 2022-10-27

## 2022-10-28 ENCOUNTER — PREP FOR SURGERY (OUTPATIENT)
Dept: OTHER | Facility: HOSPITAL | Age: 72
End: 2022-10-28

## 2022-10-28 DIAGNOSIS — R94.39 ABNORMAL STRESS TEST: Primary | ICD-10-CM

## 2022-10-28 RX ORDER — ASPIRIN 81 MG/1
324 TABLET, CHEWABLE ORAL ONCE
Status: CANCELLED | OUTPATIENT
Start: 2022-10-28 | End: 2022-10-28

## 2022-10-28 RX ORDER — ASPIRIN 81 MG/1
81 TABLET ORAL DAILY
Status: CANCELLED | OUTPATIENT
Start: 2022-10-29

## 2022-10-28 RX ORDER — SODIUM CHLORIDE 0.9 % (FLUSH) 0.9 %
10 SYRINGE (ML) INJECTION AS NEEDED
Status: CANCELLED | OUTPATIENT
Start: 2022-10-28

## 2022-10-28 RX ORDER — SODIUM CHLORIDE 0.9 % (FLUSH) 0.9 %
10 SYRINGE (ML) INJECTION EVERY 12 HOURS SCHEDULED
Status: CANCELLED | OUTPATIENT
Start: 2022-10-28

## 2022-11-02 ENCOUNTER — HOSPITAL ENCOUNTER (OUTPATIENT)
Facility: HOSPITAL | Age: 72
Setting detail: HOSPITAL OUTPATIENT SURGERY
Discharge: HOME | End: 2022-11-02
Attending: INTERNAL MEDICINE | Admitting: INTERNAL MEDICINE

## 2022-11-02 VITALS
HEIGHT: 70 IN | WEIGHT: 260.8 LBS | RESPIRATION RATE: 20 BRPM | OXYGEN SATURATION: 93 % | DIASTOLIC BLOOD PRESSURE: 74 MMHG | BODY MASS INDEX: 37.34 KG/M2 | SYSTOLIC BLOOD PRESSURE: 127 MMHG | TEMPERATURE: 97.2 F | HEART RATE: 69 BPM

## 2022-11-02 DIAGNOSIS — I10 ESSENTIAL HYPERTENSION: ICD-10-CM

## 2022-11-02 DIAGNOSIS — I25.119 CORONARY ARTERY DISEASE INVOLVING NATIVE CORONARY ARTERY OF NATIVE HEART WITH ANGINA PECTORIS: ICD-10-CM

## 2022-11-02 DIAGNOSIS — R10.9 SUDDEN ONSET OF SEVERE ABDOMINAL PAIN: ICD-10-CM

## 2022-11-02 DIAGNOSIS — Z90.3 H/O GASTRIC SLEEVE: Primary | ICD-10-CM

## 2022-11-02 DIAGNOSIS — R94.39 ABNORMAL STRESS TEST: ICD-10-CM

## 2022-11-02 DIAGNOSIS — R94.39 ABNORMAL NUCLEAR STRESS TEST: ICD-10-CM

## 2022-11-02 LAB
ALBUMIN SERPL-MCNC: 4.5 G/DL (ref 3.5–5.2)
ALBUMIN/GLOB SERPL: 2.1 G/DL
ALP SERPL-CCNC: 55 U/L (ref 39–117)
ALT SERPL W P-5'-P-CCNC: 14 U/L (ref 1–41)
ANION GAP SERPL CALCULATED.3IONS-SCNC: 12 MMOL/L (ref 5–15)
AST SERPL-CCNC: 14 U/L (ref 1–40)
BASOPHILS # BLD AUTO: 0.06 10*3/MM3 (ref 0–0.2)
BASOPHILS NFR BLD AUTO: 0.6 % (ref 0–1.5)
BILIRUB SERPL-MCNC: 0.3 MG/DL (ref 0–1.2)
BUN SERPL-MCNC: 20 MG/DL (ref 8–23)
BUN/CREAT SERPL: 24.1 (ref 7–25)
CALCIUM SPEC-SCNC: 9.1 MG/DL (ref 8.6–10.5)
CHLORIDE SERPL-SCNC: 104 MMOL/L (ref 98–107)
CHOLEST SERPL-MCNC: 104 MG/DL (ref 0–200)
CO2 SERPL-SCNC: 24 MMOL/L (ref 22–29)
CREAT BLDA-MCNC: 0.9 MG/DL (ref 0.6–1.3)
CREAT SERPL-MCNC: 0.83 MG/DL (ref 0.76–1.27)
DEPRECATED RDW RBC AUTO: 46.1 FL (ref 37–54)
EGFRCR SERPLBLD CKD-EPI 2021: 92.4 ML/MIN/1.73
EOSINOPHIL # BLD AUTO: 0.16 10*3/MM3 (ref 0–0.4)
EOSINOPHIL NFR BLD AUTO: 1.6 % (ref 0.3–6.2)
ERYTHROCYTE [DISTWIDTH] IN BLOOD BY AUTOMATED COUNT: 13.5 % (ref 12.3–15.4)
GLOBULIN UR ELPH-MCNC: 2.1 GM/DL
GLUCOSE SERPL-MCNC: 100 MG/DL (ref 65–99)
HBA1C MFR BLD: 5.5 % (ref 4.8–5.6)
HCT VFR BLD AUTO: 41.7 % (ref 37.5–51)
HDLC SERPL-MCNC: 36 MG/DL (ref 40–60)
HGB BLD-MCNC: 13.9 G/DL (ref 13–17.7)
IMM GRANULOCYTES # BLD AUTO: 0.05 10*3/MM3 (ref 0–0.05)
IMM GRANULOCYTES NFR BLD AUTO: 0.5 % (ref 0–0.5)
LDLC SERPL CALC-MCNC: 42 MG/DL (ref 0–100)
LDLC/HDLC SERPL: 1.06 {RATIO}
LYMPHOCYTES # BLD AUTO: 1.64 10*3/MM3 (ref 0.7–3.1)
LYMPHOCYTES NFR BLD AUTO: 16.7 % (ref 19.6–45.3)
MCH RBC QN AUTO: 31 PG (ref 26.6–33)
MCHC RBC AUTO-ENTMCNC: 33.3 G/DL (ref 31.5–35.7)
MCV RBC AUTO: 93.1 FL (ref 79–97)
MONOCYTES # BLD AUTO: 0.86 10*3/MM3 (ref 0.1–0.9)
MONOCYTES NFR BLD AUTO: 8.7 % (ref 5–12)
NEUTROPHILS NFR BLD AUTO: 7.07 10*3/MM3 (ref 1.7–7)
NEUTROPHILS NFR BLD AUTO: 71.9 % (ref 42.7–76)
NRBC BLD AUTO-RTO: 0 /100 WBC (ref 0–0.2)
PLATELET # BLD AUTO: 227 10*3/MM3 (ref 140–450)
PMV BLD AUTO: 10.3 FL (ref 6–12)
POTASSIUM SERPL-SCNC: 4.2 MMOL/L (ref 3.5–5.2)
PROT SERPL-MCNC: 6.6 G/DL (ref 6–8.5)
RBC # BLD AUTO: 4.48 10*6/MM3 (ref 4.14–5.8)
SODIUM SERPL-SCNC: 140 MMOL/L (ref 136–145)
TRIGL SERPL-MCNC: 150 MG/DL (ref 0–150)
VLDLC SERPL-MCNC: 26 MG/DL (ref 5–40)
WBC NRBC COR # BLD: 9.84 10*3/MM3 (ref 3.4–10.8)

## 2022-11-02 PROCEDURE — 93458 L HRT ARTERY/VENTRICLE ANGIO: CPT | Performed by: INTERNAL MEDICINE

## 2022-11-02 PROCEDURE — 83036 HEMOGLOBIN GLYCOSYLATED A1C: CPT | Performed by: NURSE PRACTITIONER

## 2022-11-02 PROCEDURE — C1894 INTRO/SHEATH, NON-LASER: HCPCS | Performed by: INTERNAL MEDICINE

## 2022-11-02 PROCEDURE — 80053 COMPREHEN METABOLIC PANEL: CPT | Performed by: NURSE PRACTITIONER

## 2022-11-02 PROCEDURE — 82565 ASSAY OF CREATININE: CPT

## 2022-11-02 PROCEDURE — 0 IOPAMIDOL PER 1 ML: Performed by: INTERNAL MEDICINE

## 2022-11-02 PROCEDURE — 25010000002 FENTANYL CITRATE (PF) 50 MCG/ML SOLUTION: Performed by: INTERNAL MEDICINE

## 2022-11-02 PROCEDURE — S0260 H&P FOR SURGERY: HCPCS | Performed by: INTERNAL MEDICINE

## 2022-11-02 PROCEDURE — 25010000002 HEPARIN (PORCINE) PER 1000 UNITS: Performed by: INTERNAL MEDICINE

## 2022-11-02 PROCEDURE — 25010000002 MIDAZOLAM PER 1 MG: Performed by: INTERNAL MEDICINE

## 2022-11-02 PROCEDURE — C1769 GUIDE WIRE: HCPCS | Performed by: INTERNAL MEDICINE

## 2022-11-02 PROCEDURE — 85025 COMPLETE CBC W/AUTO DIFF WBC: CPT | Performed by: NURSE PRACTITIONER

## 2022-11-02 PROCEDURE — 80061 LIPID PANEL: CPT | Performed by: NURSE PRACTITIONER

## 2022-11-02 RX ORDER — ASPIRIN 81 MG/1
324 TABLET, CHEWABLE ORAL ONCE
Status: COMPLETED | OUTPATIENT
Start: 2022-11-02 | End: 2022-11-02

## 2022-11-02 RX ORDER — HEPARIN SODIUM 1000 [USP'U]/ML
INJECTION, SOLUTION INTRAVENOUS; SUBCUTANEOUS
Status: DISCONTINUED | OUTPATIENT
Start: 2022-11-02 | End: 2022-11-02 | Stop reason: HOSPADM

## 2022-11-02 RX ORDER — SODIUM CHLORIDE 0.9 % (FLUSH) 0.9 %
10 SYRINGE (ML) INJECTION EVERY 12 HOURS SCHEDULED
Status: DISCONTINUED | OUTPATIENT
Start: 2022-11-02 | End: 2022-11-02 | Stop reason: HOSPADM

## 2022-11-02 RX ORDER — SODIUM CHLORIDE 0.9 % (FLUSH) 0.9 %
10 SYRINGE (ML) INJECTION AS NEEDED
Status: DISCONTINUED | OUTPATIENT
Start: 2022-11-02 | End: 2022-11-02 | Stop reason: HOSPADM

## 2022-11-02 RX ORDER — ASPIRIN 81 MG/1
81 TABLET ORAL DAILY
Status: DISCONTINUED | OUTPATIENT
Start: 2022-11-03 | End: 2022-11-02 | Stop reason: HOSPADM

## 2022-11-02 RX ORDER — MIDAZOLAM HYDROCHLORIDE 1 MG/ML
INJECTION INTRAMUSCULAR; INTRAVENOUS
Status: DISCONTINUED | OUTPATIENT
Start: 2022-11-02 | End: 2022-11-02 | Stop reason: HOSPADM

## 2022-11-02 RX ORDER — LIDOCAINE HYDROCHLORIDE 10 MG/ML
INJECTION, SOLUTION EPIDURAL; INFILTRATION; INTRACAUDAL; PERINEURAL
Status: DISCONTINUED | OUTPATIENT
Start: 2022-11-02 | End: 2022-11-02 | Stop reason: HOSPADM

## 2022-11-02 RX ORDER — FENTANYL CITRATE 50 UG/ML
INJECTION, SOLUTION INTRAMUSCULAR; INTRAVENOUS
Status: DISCONTINUED | OUTPATIENT
Start: 2022-11-02 | End: 2022-11-02 | Stop reason: HOSPADM

## 2022-11-02 RX ORDER — SODIUM CHLORIDE 9 MG/ML
330 INJECTION, SOLUTION INTRAVENOUS CONTINUOUS
Status: ACTIVE | OUTPATIENT
Start: 2022-11-02 | End: 2022-11-02

## 2022-11-02 RX ORDER — NICARDIPINE HCL-0.9% SOD CHLOR 1 MG/10 ML
SYRINGE (ML) INTRAVENOUS
Status: DISCONTINUED | OUTPATIENT
Start: 2022-11-02 | End: 2022-11-02 | Stop reason: HOSPADM

## 2022-11-02 RX ADMIN — ASPIRIN 81 MG 324 MG: 81 TABLET ORAL at 08:30

## 2022-11-02 RX ADMIN — SODIUM CHLORIDE 330 ML/HR: 9 INJECTION, SOLUTION INTRAVENOUS at 08:23

## 2022-11-02 NOTE — H&P
Pre-cardiac Catheterization History and Physical  [facility]      Patient:  Ed Geller  :  1950  MRN: [id number]    PCP:  Kreis, Samuel Duane, MD  PHONE:  [phone]    DATE: 2022  ID: Ed Geller is a 72 y.o. male     Reason for Visit: Abnormal Stress Test    PROBLEM LIST:   Active Hospital Problems    Diagnosis  POA   • **Coronary artery disease involving native coronary artery of native heart with angina pectoris (HCC) [I25.119]  Yes     · Cardiac catheterization at [facility] (): Moderate mid LAD disease.  Normal LVEF  · Pharmacologic nuclear stress (10/23/2015):  Mild apical ischemia.  Normal LVEF  · Echo at [facility] (2021): LVEF 62%.  · Pharmacologic nuclear stress (10/17/2022): Moderate inferior lateral infarct.  LVEF 52%     • Abnormal nuclear stress test [R94.39]  Yes     · Pharmacologic nuclear stress (10/17/2022): Moderate inferior lateral infarct.  LVEF 52%     • Essential hypertension [I10]  Yes     • Target blood pressure <130/80 mmHg     • Hyperlipidemia LDL goal <70 [E78.5]  Yes     · High intensity statin therapy indicated.           BRIEF HPI: Mr. Geller is a 72-year-old male with the above cardiac history who presents today for left heart cath with possible catheter-based intervention.  He has been experiencing decreased functional capacity secondary to fatigue with exertion for around a year. He reports he had several new episodes over this past weekend with heavy substernal chest pain and dyspnea. Nuclear stress test was performed which revealed a medium size infarct located in the basal inferior lateral wall with no significant ischemia.  Patient was scheduled for cardiac catheterization.       Cardiac Risk Factors: advanced age (older than 55 for men, 65 for women), dyslipidemia, hypertension, obesity (BMI >= 30 kg/m2) and sedentary lifestyle    Prior Cardiac Investigations:   Stress w MPS 10/17/22:  •  Myocardial perfusion imaging  indicates a medium-sized infarct located in the basal inferior lateral wall with no significant ischemia noted.  •  Left ventricular ejection fraction is normal.  (Calculated EF = 52%).    Echo 5/25/21:  • Left ventricular wall thickness is consistent with severe concentric hypertrophy.  • Left ventricular ejection fraction appears to be 61 - 65%. Left ventricular systolic function is normal.  • Left ventricular diastolic function is consistent with (grade I) impaired relaxation.  • The right atrial cavity is mildly dilated.    Stress with MPS 10/2015:  IMPRESSION:   1.  Normal pharmacologic myocardial perfusion study.  The aforementioned defect  most likely represents a diaphragmatic/gut attenuation.   2.  Normal left ventricular function with visually estimated calculated LVEF of  48%.     [facility] 2010 at [facility]:  · Moderate mid LAD disease, normal LVEF    Allergies:      Allergies   Allergen Reactions   • Ciprofloxacin Other (See Comments)     Caused achilles tendon to become detached.    • Elemental Sulfur Swelling   • Losartan Other (See Comments)     Dizziness   • Atorvastatin Rash   • Lisinopril Rash       MEDICATIONS:  Current Outpatient Medications   Medication Instructions   • amLODIPine (NORVASC) 5 mg, Oral, Daily   • aspirin 81 mg, Oral, Daily   • Cyanocobalamin (VITAMIN B12 PO) 5,000 mcg, Oral, Daily   • GLUCOSAMINE-CHONDROITIN PO 1 tablet, Oral, Daily   • metoprolol tartrate (LOPRESSOR) 50 mg, Oral, 2 Times Daily   • Multiple Vitamins-Minerals (CENTRUM SILVER) tablet 1 tablet, Oral, Daily   • nitroglycerin (NITROSTAT) 0.4 MG SL tablet 1 under the tongue as needed for angina, may repeat q5mins for up three doses   • omeprazole OTC (PriLOSEC OTC) 20 MG EC tablet 1 capsule, Oral   • rosuvastatin (CRESTOR) 10 mg, Oral, Daily   • spironolactone (ALDACTONE) 25 mg, Oral, Daily   • valsartan (DIOVAN) 160 mg, Oral, Daily       Past medical & surgical history, social and family history reviewed in the  "electronic medical record.    ROS: Pertinent positives listed in the HPI and problem list above. All others reviewed and negative.     Physical Exam:   /89 (BP Location: Left arm, Patient Position: Lying)   Pulse 65   Temp 97.2 °F (36.2 °C) (Tympanic)   Resp 16   Ht 177.8 cm (70\")   Wt 118 kg (260 lb 12.9 oz)   SpO2 96%   BMI 37.42 kg/m²     Constitutional:    Well-appearing 72 y.o. y/o adult in no acute distress        Heart:    Regular rhythm and normal rate, 1/6 systolic murmur, no rubs or gallops   Lungs:     Clear to auscultation bilaterally, no wheezes, rhales or rhonchi, nonlabored respirations   Abdomen:     Soft, nontender   Extremities:   No gross deformities, no edema, clubbing, or cyanosis.    Pulses:    Neuro:  Psych:   Radial  pulses palpable and equal bilaterally.    No gross focal deficits  Mood and behavior appropriate for situation     Barbaeu Test:  Left: Normal  (oxymetric Allens) Right: Abnormal - diminished response    Labs and Diagnostic Data:  Results from last 7 days   Lab Units 22  0818 22  0807   SODIUM mmol/L  --  140   POTASSIUM mmol/L  --  4.2   CHLORIDE mmol/L  --  104   CO2 mmol/L  --  24.0   BUN mg/dL  --  20   CREATININE mg/dL 0.90 0.83   GLUCOSE mg/dL  --  100*   CALCIUM mg/dL  --  9.1     Results from last 7 days   Lab Units 22  0807   WBC 10*3/mm3 9.84   HEMOGLOBIN g/dL 13.9   HEMATOCRIT % 41.7   PLATELETS 10*3/mm3 227     Lab Results   Component Value Date    CHOL 104 2022    TRIG 150 2022    HDL 36 (L) 2022    LDL 42 2022    AST 14 2022    ALT 14 2022                   EK/10/2021: Sinus rhythm with Q waves in lead III and aVF, possible inferior infarct, low voltage in precordial leads  Radiology: no new data  Tele: NSR    IMPRESSION:  · Mr. Geller is a 72-year-old male with CAD, hypertension and hyperlipidemia who presents for left heart catheterization following 1 year history of exertional fatigue and " decreased functional status and abnormal stress test on 10/17/2022.  · Patient denies complication with contrast dye or sedation in the past    PLAN:  · Procedure to perform: LHC +/- CBI. Risks, benefits and alternatives to the procedure explained to the patient and he understands and wishes to proceed.     Scribed by Oseas Shah PA-C for Dr. Florin Richter MD on 11/2/2022

## 2022-12-28 DIAGNOSIS — I10 ESSENTIAL HYPERTENSION: ICD-10-CM

## 2022-12-28 RX ORDER — VALSARTAN 160 MG/1
160 TABLET ORAL DAILY
Qty: 90 TABLET | Refills: 1 | Status: SHIPPED | OUTPATIENT
Start: 2022-12-28

## 2022-12-28 RX ORDER — SPIRONOLACTONE 25 MG/1
25 TABLET ORAL DAILY
Qty: 90 TABLET | Refills: 1 | Status: SHIPPED | OUTPATIENT
Start: 2022-12-28

## 2022-12-28 NOTE — TELEPHONE ENCOUNTER
Lab Results   Component Value Date    GLUCOSE 100 (H) 11/02/2022    BUN 20 11/02/2022    CREATININE 0.90 11/02/2022    EGFRIFNONA 94 11/06/2020    EGFRIFAFRI 114 11/06/2020    BCR 24.1 11/02/2022    K 4.2 11/02/2022    CO2 24.0 11/02/2022    CALCIUM 9.1 11/02/2022    PROTENTOTREF 7.0 09/02/2022    ALBUMIN 4.50 11/02/2022    LABIL2 1.9 09/02/2022    AST 14 11/02/2022    ALT 14 11/02/2022

## 2023-02-07 DIAGNOSIS — I10 ESSENTIAL HYPERTENSION: ICD-10-CM

## 2023-02-07 RX ORDER — AMLODIPINE BESYLATE 5 MG/1
5 TABLET ORAL DAILY
Qty: 90 TABLET | Refills: 3 | Status: SHIPPED | OUTPATIENT
Start: 2023-02-07

## 2023-04-25 NOTE — PROGRESS NOTES
"    Cardiology Outpatient Visit      Identification: Ed Geller is a 73 y.o. male who resides in [place], [place].    Reason for visit:  CAD  CV risk factors      Subjective      Ed returns to the office today.  He is doing well and denies anginal symptoms.  He ran out of rosuvastatin and has not been taking it recently.    He does not have a primary care provider.  He is not interested in any preventive care at this point i.e. colonoscopy, PSA testing.  He tells me that if he has \"anything serious\" he just would like to pass away.  He is downsizing his home and is going to sell his present house later today.      Review of Systems   Constitutional: Negative for malaise/fatigue.   Eyes: Negative for vision loss in left eye and vision loss in right eye.   Cardiovascular: Negative for chest pain, dyspnea on exertion, near-syncope, orthopnea, palpitations, paroxysmal nocturnal dyspnea and syncope.   Musculoskeletal: Negative for myalgias.   Neurological: Negative for brief paralysis, excessive daytime sleepiness, focal weakness, numbness, paresthesias and weakness.   All other systems reviewed and are negative.      Allergies   Allergen Reactions   • Ciprofloxacin Other (See Comments)     Caused achilles tendon to become detached.    • Elemental Sulfur Swelling   • Losartan Other (See Comments)     Dizziness   • Atorvastatin Rash   • Lisinopril Rash         Current Outpatient Medications   Medication Instructions   • amLODIPine (NORVASC) 5 mg, Oral, Daily   • aspirin 81 mg, Oral, Daily   • Cyanocobalamin (VITAMIN B12 PO) 5,000 mcg, Oral, Daily   • GLUCOSAMINE-CHONDROITIN PO 1 tablet, Oral, Daily   • metoprolol tartrate (LOPRESSOR) 50 mg, Oral, 2 Times Daily   • Multiple Vitamins-Minerals (CENTRUM SILVER) tablet 1 tablet, Oral, Daily   • nitroglycerin (NITROSTAT) 0.4 MG SL tablet 1 under the tongue as needed for angina, may repeat q5mins for up three doses   • omeprazole OTC (PriLOSEC OTC) 20 MG EC tablet 1 capsule, Oral " "  • potassium chloride (KLOR-CON) 20 MEQ packet 20 mEq, Oral, Daily   • rosuvastatin (CRESTOR) 10 mg, Oral, Daily   • spironolactone (ALDACTONE) 25 mg, Oral, Daily   • valsartan (DIOVAN) 160 mg, Oral, Daily         Objective     /89   Pulse 68   Ht 177.8 cm (70\")   Wt 120 kg (265 lb)   SpO2 97%   BMI 38.02 kg/m²       Constitutional:       Appearance: Healthy appearance.   Eyes:      General: No scleral icterus.  Neck:      Thyroid: No thyroid mass.      Vascular: No carotid bruit or JVD. JVD normal.   Pulmonary:      Effort: Pulmonary effort is normal.      Breath sounds: Normal breath sounds.   Cardiovascular:      Normal rate. Regular rhythm.      Murmurs: There is no murmur.      No gallop.   Skin:     General: Skin is warm. There is no cyanosis.   Neurological:      General: No focal deficit present.      Mental Status: Alert.   Psychiatric:         Attention and Perception: Attention normal.         Result Review  (reviewed with patient):            Lab Results   Component Value Date    GLUCOSE 100 (H) 11/02/2022    BUN 20 11/02/2022    CREATININE 0.90 11/02/2022    EGFRRESULT 87 09/02/2022    EGFR 92.4 11/02/2022    BCR 24.1 11/02/2022    K 4.2 11/02/2022    CO2 24.0 11/02/2022    CALCIUM 9.1 11/02/2022    PROTENTOTREF 7.0 09/02/2022    ALBUMIN 4.50 11/02/2022    BILITOT 0.3 11/02/2022    AST 14 11/02/2022    ALT 14 11/02/2022     Lab Results   Component Value Date    WBC 9.84 11/02/2022    HGB 13.9 11/02/2022    HCT 41.7 11/02/2022    MCV 93.1 11/02/2022     11/02/2022     Lab Results   Component Value Date    CHOL 104 11/02/2022    CHLPL 104 09/02/2022    TRIG 150 11/02/2022    HDL 36 (L) 11/02/2022    LDL 42 11/02/2022     Lab Results   Component Value Date    HGBA1C 5.50 11/02/2022           Assessment     Diagnoses and all orders for this visit:    1. Coronary artery disease involving native coronary artery of native heart with angina pectoris (Primary)  Overview:  · Cardiac " catheterization at \[facility]\"" (2010): Moderate mid LAD disease.  Normal LVEF  · Pharmacologic nuclear stress (10/23/2015):  Mild apical ischemia.  Normal LVEF  · Echo at [facility] (5/25/2021): LVEF 62%.  · Pharmacologic nuclear stress (10/17/2022): Moderate inferior lateral infarct.  LVEF 52%  · Cardiac catheterization (11/2/2022): Mild coronary artery disease    Assessment & Plan:  · No angina  · Continue low-dose aspirin  · Resume statin    Orders:  -     metoprolol tartrate (LOPRESSOR) 50 MG tablet; Take 1 tablet by mouth 2 (Two) Times a Day.  Dispense: 180 tablet; Refill: 3  -     nitroglycerin (NITROSTAT) 0.4 MG SL tablet; 1 under the tongue as needed for angina, may repeat q5mins for up three doses  Dispense: 25 tablet; Refill: 5  -     CBC (No Diff); Future    2. Essential hypertension  Overview:  • Target blood pressure <130/80 mmHg    Assessment & Plan:  · Mildly elevated today's visit  · Continue amlodipine, spironolactone, valsartan     Orders:  -     amLODIPine (NORVASC) 5 MG tablet; Take 1 tablet by mouth Daily.  Dispense: 90 tablet; Refill: 3  -     spironolactone (ALDACTONE) 25 MG tablet; Take 1 tablet by mouth Daily.  Dispense: 90 tablet; Refill: 3  -     valsartan (DIOVAN) 160 MG tablet; Take 1 tablet by mouth Daily.  Dispense: 90 tablet; Refill: 3  -     metoprolol tartrate (LOPRESSOR) 50 MG tablet; Take 1 tablet by mouth 2 (Two) Times a Day.  Dispense: 180 tablet; Refill: 3  -     TSH+Free T4; Future    3. Hyperlipidemia LDL goal <70  Overview:  · High intensity statin therapy indicated.    Assessment & Plan:  · Obtain CMP and lipids  · Resume rosuvastatin 10 mg     Orders:  -     Discontinue: rosuvastatin (CRESTOR) 10 MG tablet; Take 1 tablet by mouth Daily.  Dispense: 90 tablet; Refill: 3  -     rosuvastatin (CRESTOR) 10 MG tablet; Take 1 tablet by mouth Daily.  Dispense: 90 tablet; Refill: 3  -     Comprehensive Metabolic Panel; Future  -     Lipid Panel; Future        Plan   • Obtain lab work including  TSH, CMP, CBC, lipids  • Resume rosuvastatin 10 mg daily  • Continue present medical      Follow-up   Return in 1 year (on 4/26/2024) for Alternating NELSY/MD OV q1yr.        Florin Richter MD, FACC, [facility]  4/26/2023

## 2023-04-26 ENCOUNTER — OFFICE VISIT (OUTPATIENT)
Dept: CARDIOLOGY | Facility: CLINIC | Age: 73
End: 2023-04-26
Payer: MEDICARE

## 2023-04-26 VITALS
OXYGEN SATURATION: 97 % | DIASTOLIC BLOOD PRESSURE: 89 MMHG | SYSTOLIC BLOOD PRESSURE: 139 MMHG | HEART RATE: 68 BPM | WEIGHT: 265 LBS | BODY MASS INDEX: 37.94 KG/M2 | HEIGHT: 70 IN

## 2023-04-26 DIAGNOSIS — I25.119 CORONARY ARTERY DISEASE INVOLVING NATIVE CORONARY ARTERY OF NATIVE HEART WITH ANGINA PECTORIS: Primary | ICD-10-CM

## 2023-04-26 DIAGNOSIS — E78.5 HYPERLIPIDEMIA LDL GOAL <70: ICD-10-CM

## 2023-04-26 DIAGNOSIS — I10 ESSENTIAL HYPERTENSION: ICD-10-CM

## 2023-04-26 PROCEDURE — 99214 OFFICE O/P EST MOD 30 MIN: CPT | Performed by: INTERNAL MEDICINE

## 2023-04-26 PROCEDURE — 3075F SYST BP GE 130 - 139MM HG: CPT | Performed by: INTERNAL MEDICINE

## 2023-04-26 PROCEDURE — 3079F DIAST BP 80-89 MM HG: CPT | Performed by: INTERNAL MEDICINE

## 2023-04-26 RX ORDER — METOPROLOL TARTRATE 50 MG/1
50 TABLET, FILM COATED ORAL 2 TIMES DAILY
Qty: 180 TABLET | Refills: 3 | Status: SHIPPED | OUTPATIENT
Start: 2023-04-26

## 2023-04-26 RX ORDER — POTASSIUM CHLORIDE 1.5 G/1.77G
20 POWDER, FOR SOLUTION ORAL DAILY
COMMUNITY

## 2023-04-26 RX ORDER — AMLODIPINE BESYLATE 5 MG/1
5 TABLET ORAL DAILY
Qty: 90 TABLET | Refills: 3 | Status: SHIPPED | OUTPATIENT
Start: 2023-04-26

## 2023-04-26 RX ORDER — ROSUVASTATIN CALCIUM 10 MG/1
10 TABLET, COATED ORAL DAILY
Qty: 90 TABLET | Refills: 3 | Status: SHIPPED | OUTPATIENT
Start: 2023-04-26

## 2023-04-26 RX ORDER — NITROGLYCERIN 0.4 MG/1
TABLET SUBLINGUAL
Qty: 25 TABLET | Refills: 5 | Status: SHIPPED | OUTPATIENT
Start: 2023-04-26

## 2023-04-26 RX ORDER — ROSUVASTATIN CALCIUM 10 MG/1
10 TABLET, COATED ORAL DAILY
Qty: 90 TABLET | Refills: 3 | Status: SHIPPED | OUTPATIENT
Start: 2023-04-26 | End: 2023-04-26 | Stop reason: SDUPTHER

## 2023-04-26 RX ORDER — SPIRONOLACTONE 25 MG/1
25 TABLET ORAL DAILY
Qty: 90 TABLET | Refills: 3 | Status: SHIPPED | OUTPATIENT
Start: 2023-04-26

## 2023-04-26 RX ORDER — VALSARTAN 160 MG/1
160 TABLET ORAL DAILY
Qty: 90 TABLET | Refills: 3 | Status: SHIPPED | OUTPATIENT
Start: 2023-04-26

## 2023-06-07 ENCOUNTER — TELEPHONE (OUTPATIENT)
Dept: CARDIOLOGY | Facility: CLINIC | Age: 73
End: 2023-06-07
Payer: MEDICARE

## 2024-03-22 RX ORDER — OMEPRAZOLE 20 MG/1
20 CAPSULE, DELAYED RELEASE ORAL DAILY
Qty: 90 CAPSULE | Refills: 1 | Status: SHIPPED | OUTPATIENT
Start: 2024-03-22

## 2024-04-24 NOTE — PROGRESS NOTES
Cardiology Outpatient Visit      Identification: Ed Geller is a 74 y.o. male who resides in [place], [place].    Reason for visit:  Mild CAD  CV risk factors  Difficult to control hypertension      Subjective      Ed returns to the office today.  He is doing well and denies anginal symptoms.  He states his blood pressure has been under excellent control, which has been difficult in the past.  He is tolerating his present medicines without side effect.  He has not had blood work performed since last year, but is amenable to getting it done next week.    Review of Systems   Constitutional: Negative for malaise/fatigue.   Eyes:  Negative for vision loss in left eye and vision loss in right eye.   Cardiovascular:  Negative for chest pain, dyspnea on exertion, near-syncope, orthopnea, palpitations, paroxysmal nocturnal dyspnea and syncope.   Musculoskeletal:  Negative for myalgias.   Neurological:  Negative for brief paralysis, excessive daytime sleepiness, focal weakness, numbness, paresthesias and weakness.   All other systems reviewed and are negative.      Allergies   Allergen Reactions    Ciprofloxacin Other (See Comments)     Caused achilles tendon to become detached.     Elemental Sulfur Swelling    Losartan Other (See Comments)     Dizziness    Atorvastatin Rash    Lisinopril Rash         Current Outpatient Medications   Medication Instructions    amLODIPine (NORVASC) 5 mg, Oral, Daily    aspirin 81 mg, Oral, Daily    Cyanocobalamin (VITAMIN B12 PO) 5,000 mcg, Oral, Daily    GLUCOSAMINE-CHONDROITIN PO 1 tablet, Oral, Daily    metoprolol tartrate (LOPRESSOR) 50 mg, Oral, 2 Times Daily    Multiple Vitamins-Minerals (CENTRUM SILVER) tablet 1 tablet, Oral, Daily    nitroglycerin (NITROSTAT) 0.4 MG SL tablet 1 under the tongue as needed for angina, may repeat q5mins for up three doses    omeprazole (PRILOSEC) 20 mg, Oral, Daily    potassium chloride (KLOR-CON) 20 MEQ packet 20 mEq, Oral, Daily    rosuvastatin  "(CRESTOR) 10 mg, Oral, Daily    spironolactone (ALDACTONE) 25 mg, Oral, Daily    valsartan (DIOVAN) 160 mg, Oral, Daily         Objective     /70 (BP Location: Right arm, Patient Position: Sitting, Cuff Size: Adult)   Pulse 72   Ht 177.8 cm (70\")   Wt 120 kg (265 lb)   SpO2 95%   BMI 38.02 kg/m²       Constitutional:       Appearance: Healthy appearance. Obese.   Eyes:      General: No scleral icterus.  Neck:      Thyroid: No thyroid mass.      Vascular: No carotid bruit or JVD. JVD normal.   Pulmonary:      Effort: Pulmonary effort is normal.      Breath sounds: Normal breath sounds.   Cardiovascular:      Normal rate. Regular rhythm.      Murmurs: There is no murmur.      No gallop.    Edema:     Peripheral edema absent.   Skin:     General: Skin is warm. There is no cyanosis.   Neurological:      General: No focal deficit present.      Mental Status: Alert.   Psychiatric:         Attention and Perception: Attention normal.         Result Review  (reviewed with patient):            Labs (5/9/2023):  Chol 63, Trig 132, HDL 33, LDL <10  Glucose 101, creat 0.87, BUN 20, Na 140, K 4.3, AST 14, ALT 14  WBC 8.9, RBC 4.55, HGB 14.5, HCT 43.0,           Assessment     Diagnoses and all orders for this visit:    1. Coronary artery disease involving native coronary artery of native heart without angina pectoris (Primary)  Overview:  Cardiac catheterization at [facility] (2010): Moderate mid LAD disease.  Normal LVEF  Pharmacologic nuclear stress (10/23/2015):  Mild apical ischemia.  Normal LVEF  Echo at [facility] (5/25/2021): LVEF 62%.  Pharmacologic nuclear stress (10/17/2022): Moderate inferior lateral infarct.  LVEF 52%  Cardiac catheterization (11/2/2022): Mild coronary artery disease    Assessment & Plan:  No angina  Continue low-dose aspirin, statin    Orders:  -     CBC (No Diff); Future  -     aspirin 81 MG EC tablet; Take 1 tablet by mouth Daily.  Dispense: 90 tablet; Refill: 3  -     nitroglycerin (NITROSTAT) " 0.4 MG SL tablet; 1 under the tongue as needed for angina, may repeat q5mins for up three doses  Dispense: 25 tablet; Refill: 5  -     metoprolol tartrate (LOPRESSOR) 50 MG tablet; Take 1 tablet by mouth 2 (Two) Times a Day.  Dispense: 180 tablet; Refill: 3    2. Hyperlipidemia LDL goal <70  Overview:  High intensity statin therapy indicated.    Assessment & Plan:  Obtain direct LDL and CMP    Orders:  -     LDL Cholesterol, Direct; Future  -     Comprehensive Metabolic Panel; Future  -     rosuvastatin (CRESTOR) 10 MG tablet; Take 1 tablet by mouth Daily.  Dispense: 90 tablet; Refill: 3    3. Essential hypertension  Overview:  Target blood pressure <130/80 mmHg    Assessment & Plan:  Controlled  Continue valsartan, amlodipine, spironolactone    Orders:  -     Comprehensive Metabolic Panel; Future  -     metoprolol tartrate (LOPRESSOR) 50 MG tablet; Take 1 tablet by mouth 2 (Two) Times a Day.  Dispense: 180 tablet; Refill: 3  -     amLODIPine (NORVASC) 5 MG tablet; Take 1 tablet by mouth Daily.  Dispense: 90 tablet; Refill: 3  -     valsartan (DIOVAN) 160 MG tablet; Take 1 tablet by mouth Daily.  Dispense: 90 tablet; Refill: 3  -     spironolactone (ALDACTONE) 25 MG tablet; Take 1 tablet by mouth Daily.  Dispense: 90 tablet; Refill: 3          Plan   Obtain direct LDL, CMP, CBC      Follow-up   Return in about 1 year (around 4/26/2025).        Florin Richter MD, FACC, [facility]  4/26/2024

## 2024-04-26 ENCOUNTER — OFFICE VISIT (OUTPATIENT)
Dept: CARDIOLOGY | Facility: CLINIC | Age: 74
End: 2024-04-26
Payer: MEDICARE

## 2024-04-26 VITALS
DIASTOLIC BLOOD PRESSURE: 70 MMHG | BODY MASS INDEX: 37.94 KG/M2 | HEART RATE: 72 BPM | HEIGHT: 70 IN | WEIGHT: 265 LBS | OXYGEN SATURATION: 95 % | SYSTOLIC BLOOD PRESSURE: 136 MMHG

## 2024-04-26 DIAGNOSIS — E78.5 HYPERLIPIDEMIA LDL GOAL <70: ICD-10-CM

## 2024-04-26 DIAGNOSIS — I10 ESSENTIAL HYPERTENSION: ICD-10-CM

## 2024-04-26 DIAGNOSIS — I25.10 CORONARY ARTERY DISEASE INVOLVING NATIVE CORONARY ARTERY OF NATIVE HEART WITHOUT ANGINA PECTORIS: Primary | ICD-10-CM

## 2024-04-26 RX ORDER — NITROGLYCERIN 0.4 MG/1
TABLET SUBLINGUAL
Qty: 25 TABLET | Refills: 5 | Status: SHIPPED | OUTPATIENT
Start: 2024-04-26

## 2024-04-26 RX ORDER — METOPROLOL TARTRATE 50 MG/1
50 TABLET, FILM COATED ORAL 2 TIMES DAILY
Qty: 180 TABLET | Refills: 3 | Status: SHIPPED | OUTPATIENT
Start: 2024-04-26

## 2024-04-26 RX ORDER — ASPIRIN 81 MG/1
81 TABLET ORAL DAILY
Qty: 90 TABLET | Refills: 3 | Status: SHIPPED | OUTPATIENT
Start: 2024-04-26

## 2024-04-26 RX ORDER — SPIRONOLACTONE 25 MG/1
25 TABLET ORAL DAILY
Qty: 90 TABLET | Refills: 3 | Status: SHIPPED | OUTPATIENT
Start: 2024-04-26

## 2024-04-26 RX ORDER — AMLODIPINE BESYLATE 5 MG/1
5 TABLET ORAL DAILY
Qty: 90 TABLET | Refills: 3 | Status: SHIPPED | OUTPATIENT
Start: 2024-04-26

## 2024-04-26 RX ORDER — ROSUVASTATIN CALCIUM 10 MG/1
10 TABLET, COATED ORAL DAILY
Qty: 90 TABLET | Refills: 3 | Status: SHIPPED | OUTPATIENT
Start: 2024-04-26

## 2024-04-26 RX ORDER — VALSARTAN 160 MG/1
160 TABLET ORAL DAILY
Qty: 90 TABLET | Refills: 3 | Status: SHIPPED | OUTPATIENT
Start: 2024-04-26

## 2024-07-10 ENCOUNTER — TELEPHONE (OUTPATIENT)
Dept: CARDIOLOGY | Facility: CLINIC | Age: 74
End: 2024-07-10
Payer: MEDICARE

## 2024-07-10 DIAGNOSIS — Z98.84 HISTORY OF GASTRIC BYPASS: ICD-10-CM

## 2024-07-10 DIAGNOSIS — R10.31 RIGHT LOWER QUADRANT ABDOMINAL PAIN: ICD-10-CM

## 2024-07-10 DIAGNOSIS — K21.00 GASTROESOPHAGEAL REFLUX DISEASE WITH ESOPHAGITIS, UNSPECIFIED WHETHER HEMORRHAGE: Primary | ICD-10-CM

## 2024-07-10 NOTE — TELEPHONE ENCOUNTER
If he is having severe abdominal pain I recommend he go to the emergency room for evaluation.  I am not specialized to diagnose or treat abdominal pain.  He has a previous history of gastric sleeve surgery which may be a contributing factor.

## 2024-07-10 NOTE — TELEPHONE ENCOUNTER
Patient's wife called reporting that the patient is having severe right sided abdominal pain and back pain. They are really concerned. He has no PCP and asking for a GI referral and recommendation. They want to get in ASAP because the pain is getting worse. They did not want to go to the ER.     [facility] 11/2022- Mild CAD    I can send referral. Just making sure you didn't want any testing prior?

## 2024-07-11 NOTE — TELEPHONE ENCOUNTER
Spoke with patient and wife. Describes right sided abdominal pain. Worse with movement. He does not have a PCP. Advised him to go to the ER if pain is severe and affecting his ADL's as he reports. Did refer to GI  but told him that could take awhile to get an appt. He verbalized understanding of recommendations to seek emergent care.

## 2024-09-09 ENCOUNTER — HOSPITAL ENCOUNTER (OUTPATIENT)
Dept: GENERAL RADIOLOGY | Facility: HOSPITAL | Age: 74
Discharge: HOME | End: 2024-09-09
Admitting: INTERNAL MEDICINE
Payer: MEDICARE

## 2024-09-09 ENCOUNTER — TRANSCRIBE ORDERS (OUTPATIENT)
Dept: ADMINISTRATIVE | Facility: HOSPITAL | Age: 74
End: 2024-09-09
Payer: MEDICARE

## 2024-09-09 DIAGNOSIS — M54.9 BACK PAIN, UNSPECIFIED BACK LOCATION, UNSPECIFIED BACK PAIN LATERALITY, UNSPECIFIED CHRONICITY: Primary | ICD-10-CM

## 2024-09-09 DIAGNOSIS — R20.0 NUMBNESS OF FINGERS: ICD-10-CM

## 2024-09-09 DIAGNOSIS — M54.9 BACK PAIN, UNSPECIFIED BACK LOCATION, UNSPECIFIED BACK PAIN LATERALITY, UNSPECIFIED CHRONICITY: ICD-10-CM

## 2024-09-09 PROCEDURE — 72110 X-RAY EXAM L-2 SPINE 4/>VWS: CPT | Performed by: RADIOLOGY

## 2024-09-09 PROCEDURE — 73140 X-RAY EXAM OF FINGER(S): CPT | Performed by: RADIOLOGY

## 2024-09-09 PROCEDURE — 72110 X-RAY EXAM L-2 SPINE 4/>VWS: CPT

## 2024-09-09 PROCEDURE — 73140 X-RAY EXAM OF FINGER(S): CPT

## 2025-05-02 PROBLEM — I50.30 HEART FAILURE WITH PRESERVED LEFT VENTRICULAR FUNCTION (HFPEF): Status: ACTIVE | Noted: 2025-05-02

## 2025-05-02 PROBLEM — I50.30 HEART FAILURE WITH PRESERVED LEFT VENTRICULAR FUNCTION (HFPEF): Status: RESOLVED | Noted: 2025-05-02 | Resolved: 2025-05-02

## 2025-07-22 NOTE — PROGRESS NOTES
"    Cardiology Outpatient Visit      Identification: Ed Geller is a 75 y.o. male who resides in [facility].     Reason for visit:  Coronary Artery Disease (Coronary artery disease involving native coronary artery of native heart without angina pectoris//)      Subjective      Mr. Geller returns to the office today for routine follow-up.  We prescribed rosuvastatin to him at last visit and he cannot take this due to muscle ache and dizziness.    Patient reports struggling with his weight.  He has tried dieting and exercise with no avail.  He does have sleep apnea.    ROS    Allergies   Allergen Reactions    Ciprofloxacin Other (See Comments)     Caused achilles tendon to become detached.     Elemental Sulfur Swelling    Losartan Other (See Comments)     Dizziness    Rosuvastatin Dizziness    Atorvastatin Rash    Lisinopril Rash         Current Outpatient Medications   Medication Instructions    amLODIPine (NORVASC) 5 mg, Oral, Daily    aspirin 81 mg, Oral, Daily    Cyanocobalamin (VITAMIN B12 PO) 5,000 mcg, Daily    GLUCOSAMINE-CHONDROITIN PO 1 tablet, Daily    metoprolol tartrate (LOPRESSOR) 50 mg, Oral, 2 Times Daily    nitroglycerin (NITROSTAT) 0.4 MG SL tablet 1 under the tongue as needed for angina, may repeat q5mins for up three doses    omeprazole (PRILOSEC) 20 mg, Oral, Daily    potassium chloride (KLOR-CON) 20 MEQ packet 20 mEq, Daily    spironolactone (ALDACTONE) 25 mg, Oral, Daily    valsartan (DIOVAN) 160 mg, Oral, Daily         Objective     /74 (BP Location: Right arm, Patient Position: Sitting, Cuff Size: Adult)   Pulse 63   Ht 175.3 cm (69\")   Wt 125 kg (275 lb)   SpO2 95%   BMI 40.61 kg/m²       Constitutional:       Appearance: Healthy appearance.   Eyes:      General: No scleral icterus.  Neck:      Thyroid: No thyroid mass.      Vascular: No carotid bruit or JVD. JVD normal.   Pulmonary:      Effort: Pulmonary effort is normal.      Breath sounds: Normal breath sounds. "   Cardiovascular:      Normal rate. Regular rhythm.      Murmurs: There is no murmur.      No gallop.    Edema:     Peripheral edema absent.   Skin:     General: Skin is warm. There is no cyanosis.   Neurological:      General: No focal deficit present.      Mental Status: Alert.   Psychiatric:         Attention and Perception: Attention normal.         Result Review  (reviewed with patient):            Labs (8/27/2024):    WBC 9.89, RBC 4.41, HGB 13.9, HCT 41.8,   Glucose 100, creat 1.0, BUN 26  Trig 148, HDL 36, LDL 18  TSH 1.68        Assessment     Diagnoses and all orders for this visit:    1. Coronary artery disease involving native coronary artery of native heart without angina pectoris (Primary)  Overview:  Cardiac catheterization at [facility] (2010): Moderate mid LAD disease.  Normal LVEF  Pharmacologic nuclear stress (10/23/2015):  Mild apical ischemia.  Normal LVEF  Echo at [facility] [place] (5/25/2021): LVEF 62%.  Pharmacologic nuclear stress (10/17/2022): Moderate inferior lateral infarct.  LVEF 52%  Cardiac catheterization (11/2/2022): Mild coronary artery disease    Assessment & Plan:  No angina  Continue low-dose aspirin, statin    Orders:  -     LDL Cholesterol, Direct; Future  -     Comprehensive Metabolic Panel; Future    2. Hyperlipidemia LDL goal <70  Overview:  Intolerant rosuvastatin and atorvastatin    Assessment & Plan:  Intolerant to atorvastatin and rosuvastatin  Obtain direct LDL and CMP    Orders:  -     LDL Cholesterol, Direct; Future  -     Comprehensive Metabolic Panel; Future    3. Morbid obesity with BMI of 40.0-44.9, adult  -     Ambulatory Referral to Disease State Management    4. Obstructive sleep apnea  -     Ambulatory Referral to Disease State Management    5. Essential hypertension  Overview:  Target blood pressure <130/80 mmHg    Assessment & Plan:  Controlled  Continue valsartan, amlodipine, spironolactone            Plan   Refer to [facility] pharmacy for weight management and  treatment of sleep apnea with tirzepatide  Obtain direct LDL and CMP with upcoming blood work      Follow-up   Return in about 1 year (around 7/23/2026).        Florin Richter MD, FACC, [facility]  7/25/2025

## 2025-07-23 ENCOUNTER — OFFICE VISIT (OUTPATIENT)
Dept: CARDIOLOGY | Facility: CLINIC | Age: 75
End: 2025-07-23
Payer: MEDICARE

## 2025-07-23 VITALS
OXYGEN SATURATION: 95 % | SYSTOLIC BLOOD PRESSURE: 122 MMHG | HEIGHT: 69 IN | BODY MASS INDEX: 40.73 KG/M2 | DIASTOLIC BLOOD PRESSURE: 74 MMHG | WEIGHT: 275 LBS | HEART RATE: 63 BPM

## 2025-07-23 DIAGNOSIS — E78.5 HYPERLIPIDEMIA LDL GOAL <70: ICD-10-CM

## 2025-07-23 DIAGNOSIS — I25.10 CORONARY ARTERY DISEASE INVOLVING NATIVE CORONARY ARTERY OF NATIVE HEART WITHOUT ANGINA PECTORIS: Primary | ICD-10-CM

## 2025-07-23 DIAGNOSIS — E66.01 MORBID OBESITY WITH BMI OF 40.0-44.9, ADULT: ICD-10-CM

## 2025-07-23 DIAGNOSIS — G47.33 OBSTRUCTIVE SLEEP APNEA: ICD-10-CM

## 2025-07-23 DIAGNOSIS — I10 ESSENTIAL HYPERTENSION: ICD-10-CM

## (undated) DEVICE — ST EXT IV SMARTSITE 2VLV SP M LL 5ML IV1

## (undated) DEVICE — MODEL AT P65, P/N 701554-001KIT CONTENTS: HAND CONTROLLER, 3-WAY HIGH-PRESSURE STOPCOCK WITH ROTATING END AND PREMIUM HIGH-PRESSURE TUBING: Brand: ANGIOTOUCH® KIT

## (undated) DEVICE — GLIDESHEATH BASIC HYDROPHILIC COATED INTRODUCER SHEATH: Brand: GLIDESHEATH

## (undated) DEVICE — DEV COMP RAD PRELUDESYNC 24CM

## (undated) DEVICE — CATH DIAG EXPO M/ PK 6FR FL4/FR4 PIG 3PK

## (undated) DEVICE — PK CATH CARD 10

## (undated) DEVICE — CATH DIAG EXPO .056 FL3.5 6F 100CM

## (undated) DEVICE — RADIFOCUS GLIDEWIRE: Brand: GLIDEWIRE

## (undated) DEVICE — ST ACC MICROPUNCTURE STFF .018 ECHO/PLDM/TP 5F/10CM 21G/7CM

## (undated) DEVICE — GW INQWIRE FC PTFE STD J/1.5 .035 260

## (undated) DEVICE — ST INF PRI SMRTSTE 20DRP 2VLV 24ML 117

## (undated) DEVICE — MODEL BT2000 P/N 700287-012KIT CONTENTS: MANIFOLD WITH SALINE AND CONTRAST PORTS, SALINE TUBING WITH SPIKE AND HAND SYRINGE, TRANSDUCER: Brand: BT2000 AUTOMATED MANIFOLD KIT